# Patient Record
Sex: FEMALE | Race: ASIAN | Employment: UNEMPLOYED | URBAN - METROPOLITAN AREA
[De-identification: names, ages, dates, MRNs, and addresses within clinical notes are randomized per-mention and may not be internally consistent; named-entity substitution may affect disease eponyms.]

---

## 2020-08-26 LAB
HBSAG, EXTERNAL: NORMAL
HIV, EXTERNAL: NORMAL
RUBELLA, EXTERNAL: NORMAL
TYPE, ABO & RH, EXTERNAL: NORMAL

## 2020-11-19 LAB
CHLAMYDIA, EXTERNAL: NORMAL
N. GONORRHEA, EXTERNAL: NORMAL

## 2020-12-31 ENCOUNTER — HOSPITAL ENCOUNTER (INPATIENT)
Age: 30
LOS: 9 days | Discharge: HOME | DRG: 832 | End: 2021-01-09
Attending: OBSTETRICS & GYNECOLOGY | Admitting: OBSTETRICS & GYNECOLOGY
Payer: COMMERCIAL

## 2020-12-31 PROBLEM — O46.90 VAGINAL BLEEDING IN PREGNANCY: Status: ACTIVE | Noted: 2020-12-31

## 2020-12-31 LAB
ABO + RH BLD: NORMAL
ALBUMIN SERPL-MCNC: 2.8 G/DL (ref 3.5–5)
ALBUMIN/GLOB SERPL: 0.8 {RATIO} (ref 1.1–2.2)
ALP SERPL-CCNC: 110 U/L (ref 45–117)
ALT SERPL-CCNC: 25 U/L (ref 12–78)
ANION GAP SERPL CALC-SCNC: 6 MMOL/L (ref 5–15)
AST SERPL-CCNC: 18 U/L (ref 15–37)
BASOPHILS # BLD: 0 K/UL (ref 0–0.1)
BASOPHILS NFR BLD: 0 % (ref 0–1)
BILIRUB SERPL-MCNC: 0.3 MG/DL (ref 0.2–1)
BLOOD GROUP ANTIBODIES SERPL: NORMAL
BUN SERPL-MCNC: 4 MG/DL (ref 6–20)
BUN/CREAT SERPL: 11 (ref 12–20)
CALCIUM SERPL-MCNC: 8.9 MG/DL (ref 8.5–10.1)
CHLORIDE SERPL-SCNC: 108 MMOL/L (ref 97–108)
CO2 SERPL-SCNC: 24 MMOL/L (ref 21–32)
COVID-19 RAPID TEST, COVR: NOT DETECTED
CREAT SERPL-MCNC: 0.38 MG/DL (ref 0.55–1.02)
DIFFERENTIAL METHOD BLD: ABNORMAL
EOSINOPHIL # BLD: 0.1 K/UL (ref 0–0.4)
EOSINOPHIL NFR BLD: 1 % (ref 0–7)
ERYTHROCYTE [DISTWIDTH] IN BLOOD BY AUTOMATED COUNT: 16.7 % (ref 11.5–14.5)
GLOBULIN SER CALC-MCNC: 3.6 G/DL (ref 2–4)
GLUCOSE SERPL-MCNC: 61 MG/DL (ref 65–100)
GRBS, EXTERNAL: POSITIVE
GRBS, EXTERNAL: POSITIVE
HCT VFR BLD AUTO: 33.1 % (ref 35–47)
HGB BLD-MCNC: 10.8 G/DL (ref 11.5–16)
IMM GRANULOCYTES # BLD AUTO: 0 K/UL
IMM GRANULOCYTES NFR BLD AUTO: 0 %
LYMPHOCYTES # BLD: 1.9 K/UL (ref 0.8–3.5)
LYMPHOCYTES NFR BLD: 19 % (ref 12–49)
MCH RBC QN AUTO: 26.6 PG (ref 26–34)
MCHC RBC AUTO-ENTMCNC: 32.6 G/DL (ref 30–36.5)
MCV RBC AUTO: 81.5 FL (ref 80–99)
METAMYELOCYTES NFR BLD MANUAL: 1 %
MONOCYTES # BLD: 0.8 K/UL (ref 0–1)
MONOCYTES NFR BLD: 8 % (ref 5–13)
NEUTS SEG # BLD: 7.2 K/UL (ref 1.8–8)
NEUTS SEG NFR BLD: 71 % (ref 32–75)
NRBC # BLD: 0 K/UL (ref 0–0.01)
NRBC BLD-RTO: 0 PER 100 WBC
PLATELET # BLD AUTO: 144 K/UL (ref 150–400)
PLATELET COMMENTS,PCOM: ABNORMAL
PMV BLD AUTO: 10.8 FL (ref 8.9–12.9)
POTASSIUM SERPL-SCNC: 3.9 MMOL/L (ref 3.5–5.1)
PROT SERPL-MCNC: 6.4 G/DL (ref 6.4–8.2)
RBC # BLD AUTO: 4.06 M/UL (ref 3.8–5.2)
RBC MORPH BLD: ABNORMAL
SODIUM SERPL-SCNC: 138 MMOL/L (ref 136–145)
SOURCE, COVRS: NORMAL
SPECIMEN EXP DATE BLD: NORMAL
SPECIMEN SOURCE, FCOV2M: NORMAL
SPECIMEN TYPE, XMCV1T: NORMAL
WBC # BLD AUTO: 10.2 K/UL (ref 3.6–11)
WBC MORPH BLD: ABNORMAL

## 2020-12-31 PROCEDURE — 80053 COMPREHEN METABOLIC PANEL: CPT

## 2020-12-31 PROCEDURE — 85025 COMPLETE CBC W/AUTO DIFF WBC: CPT

## 2020-12-31 PROCEDURE — 65270000029 HC RM PRIVATE

## 2020-12-31 PROCEDURE — 87147 CULTURE TYPE IMMUNOLOGIC: CPT

## 2020-12-31 PROCEDURE — 87635 SARS-COV-2 COVID-19 AMP PRB: CPT

## 2020-12-31 PROCEDURE — 86901 BLOOD TYPING SEROLOGIC RH(D): CPT

## 2020-12-31 PROCEDURE — 87081 CULTURE SCREEN ONLY: CPT

## 2020-12-31 PROCEDURE — 74011250636 HC RX REV CODE- 250/636: Performed by: OBSTETRICS & GYNECOLOGY

## 2020-12-31 PROCEDURE — 99284 EMERGENCY DEPT VISIT MOD MDM: CPT

## 2020-12-31 PROCEDURE — 36415 COLL VENOUS BLD VENIPUNCTURE: CPT

## 2020-12-31 PROCEDURE — 74011250636 HC RX REV CODE- 250/636

## 2020-12-31 PROCEDURE — 74011000258 HC RX REV CODE- 258: Performed by: OBSTETRICS & GYNECOLOGY

## 2020-12-31 RX ORDER — BETAMETHASONE SODIUM PHOSPHATE AND BETAMETHASONE ACETATE 3; 3 MG/ML; MG/ML
12 INJECTION, SUSPENSION INTRA-ARTICULAR; INTRALESIONAL; INTRAMUSCULAR; SOFT TISSUE EVERY 24 HOURS
Status: COMPLETED | OUTPATIENT
Start: 2020-12-31 | End: 2021-01-01

## 2020-12-31 RX ORDER — TERBUTALINE SULFATE 1 MG/ML
0.25 INJECTION SUBCUTANEOUS AS NEEDED
Status: DISCONTINUED | OUTPATIENT
Start: 2020-12-31 | End: 2021-01-01 | Stop reason: HOSPADM

## 2020-12-31 RX ORDER — MAGNESIUM SULFATE HEPTAHYDRATE 40 MG/ML
4 INJECTION, SOLUTION INTRAVENOUS ONCE
Status: COMPLETED | OUTPATIENT
Start: 2020-12-31 | End: 2020-12-31

## 2020-12-31 RX ORDER — SODIUM CHLORIDE, SODIUM LACTATE, POTASSIUM CHLORIDE, CALCIUM CHLORIDE 600; 310; 30; 20 MG/100ML; MG/100ML; MG/100ML; MG/100ML
125 INJECTION, SOLUTION INTRAVENOUS CONTINUOUS
Status: DISCONTINUED | OUTPATIENT
Start: 2020-12-31 | End: 2021-01-09 | Stop reason: HOSPADM

## 2020-12-31 RX ORDER — MAGNESIUM SULFATE HEPTAHYDRATE 40 MG/ML
INJECTION, SOLUTION INTRAVENOUS
Status: COMPLETED
Start: 2020-12-31 | End: 2020-12-31

## 2020-12-31 RX ORDER — CALCIUM CARBONATE 200(500)MG
200 TABLET,CHEWABLE ORAL
Status: DISCONTINUED | OUTPATIENT
Start: 2021-01-01 | End: 2020-12-31

## 2020-12-31 RX ORDER — CALCIUM CARBONATE 200(500)MG
200 TABLET,CHEWABLE ORAL
Status: DISCONTINUED | OUTPATIENT
Start: 2021-01-01 | End: 2021-01-01

## 2020-12-31 RX ORDER — SODIUM CHLORIDE 0.9 % (FLUSH) 0.9 %
5-40 SYRINGE (ML) INJECTION AS NEEDED
Status: DISCONTINUED | OUTPATIENT
Start: 2020-12-31 | End: 2021-01-01 | Stop reason: HOSPADM

## 2020-12-31 RX ORDER — ACETAMINOPHEN 325 MG/1
650 TABLET ORAL
Status: DISCONTINUED | OUTPATIENT
Start: 2020-12-31 | End: 2021-01-01 | Stop reason: HOSPADM

## 2020-12-31 RX ADMIN — SODIUM CHLORIDE, POTASSIUM CHLORIDE, SODIUM LACTATE AND CALCIUM CHLORIDE 125 ML/HR: 600; 310; 30; 20 INJECTION, SOLUTION INTRAVENOUS at 12:00

## 2020-12-31 RX ADMIN — MAGNESIUM SULFATE HEPTAHYDRATE 2 G/HR: 500 INJECTION, SOLUTION INTRAMUSCULAR; INTRAVENOUS at 22:23

## 2020-12-31 RX ADMIN — SODIUM CHLORIDE 2.5 MILLION UNITS: 9 INJECTION, SOLUTION INTRAVENOUS at 21:51

## 2020-12-31 RX ADMIN — MAGNESIUM SULFATE HEPTAHYDRATE 4 G: 40 INJECTION, SOLUTION INTRAVENOUS at 16:40

## 2020-12-31 RX ADMIN — SODIUM CHLORIDE 5 MILLION UNITS: 900 INJECTION INTRAVENOUS at 17:30

## 2020-12-31 RX ADMIN — MAGNESIUM SULFATE HEPTAHYDRATE 2 G/HR: 500 INJECTION, SOLUTION INTRAMUSCULAR; INTRAVENOUS at 17:10

## 2020-12-31 RX ADMIN — BETAMETHASONE SODIUM PHOSPHATE AND BETAMETHASONE ACETATE 12 MG: 3; 3 INJECTION, SUSPENSION INTRA-ARTICULAR; INTRALESIONAL; INTRAMUSCULAR at 13:07

## 2020-12-31 NOTE — PROGRESS NOTES
2020 11:42 AM 29 YO  at 25.5 weeks admitted for observation from office to Dr. Jackelin Hardwick for Dr. Francisco Oliveira. Pt presents with bleeding. Pad changed on admission and scant bright red blood noted. Not saturated to edges of pad. Pt admission init  1200 PIV and labwork done  1230 Dr. Jackelin Hardwick at bedside discussing plan with pt to monitor and init betamethasone. 1300 First betamethasone given. Pt given crackers and water. 1545 Pt called out to say she felt increased bleeding. Pad saturated and orange sized clot expelled from vagina. Dr. Jackelin Hardwick called to bedside  1600 Dr. Skyler Mims at bedside Spec exam and SVE performed. FT dilated. Bedside US, pos VTX. DIscussing plan of care with patient. No further active bleeding  1615 Pt up to bathroom  1640 Magnesium bolus init, GBS done  1730 PCN init and rapid covid test done  1800 Pt up to bedside commode with out difficulty. Scant amount blood on toilet paper. No active bleeding  1930 Bedside shift change report given to 15[facility] (oncoming nurse) by Nemo Melgar RN (offgoing nurse). Report included the following information SBAR, Procedure Summary, Intake/Output, MAR, Accordion, Recent Results and Med Rec Status.

## 2020-12-31 NOTE — H&P
History and Physical    Patient: Vero Markham MRN: [id number]  SSN: [id number]    YOB: 1990  Age: 31 y.o. Sex: female      Subjective:      Vero Markham is a 31 y.o.  at 25.5 sent from office with vaginal bleeding. Patient has intermittently had brown spotting during the pregnancy. She was examined in the office yesterday with minimal blood noted. Today bright red heavier bleeding occurred that was on bedding etc. In office cervix was closed and NST was a reassuring. Patient complains of low back pain that is constant and intermittent cramping that started today. Reports her bleeding is a slightly decreased from when she first arrived to office     Anatomy scan in office notes posterior placenta     Labs: O+/Antibody neg/ Rubella immune/ Hep B neg/ HIV neg/ TPA neg/ Gonorrhea neg/ Chlamydia neg     No past medical history on file. Past Surgical History:   Procedure Laterality Date    HX OTHER SURGICAL  2019    D&C      No family history on file. Social History     Tobacco Use    Smoking status: Never Smoker    Smokeless tobacco: Never Used   Substance Use Topics    Alcohol use: Not Currently      Prior to Admission medications    Medication Sig Start Date End Date Taking? Authorizing Provider   prenatal vit calc,iron,folic (PRENATAL VITAMIN PO) Take 1 Tab by mouth nightly. Indications: pregnancy   Yes Lorice Kocher, MD        No Known Allergies    Review of Systems:  A comprehensive review of systems was negative except for that written in the History of Present Illness.     Objective:     Vitals:    20 1156 20 1215   BP: 103/63    Pulse: (!) 101    Resp: 14    Temp: 98.4 °F (36.9 °C)    Weight:  60.3 kg (133 lb)   Height:  5' 1\" (1.549 m)        Physical Exam:  Gen: alert and oriented X3   Resp:nonlabored respirations  Cardiac: normal peripheral perfusion  Abdomen: Gravid   SVE: per Dr. Angela Pacheco in office closed/long/ high     Assessment:     Hospital Problems  Never Reviewed          Codes Class Noted POA    Vaginal bleeding in pregnancy ICD-10-CM: O46.90  ICD-9-CM: 641.93  2020 Unknown              Assessment and Plan:     30 yo  at 25.5 with bleeding. Posterior placenta. Blood type O+. Type and screen and CBC now   Continuous fetal monitoring   IV and fluid hydration   NPO now  BMZ ordered   Will continue to monitor bleeding at this time   Should bleeding increase or signs of fetal distress occur will start Magnesium for neruoprotection and proceed with delivery.        Signed By: Richard Donahue MD     2020

## 2020-12-31 NOTE — CONSULTS
Neonatology Antepartum Consultation    Name: Vero Markham   Medical Record Number: [id number]   YOB: 1990  Today's Date: 2020                                                                 Date of Consultation:  2020  Time: 5:02 PM  NICU Provider: Rm Cameron PA-C  Referring Physician: Sarah Grossman MD  Admission Diagnoses: Vaginal bleeding in pregnancy [O46.90]  Reason for Consultation:  25 5/7 weeks gestation pregnancy with vaginal bleeding. Most recent exam with cervix now fingertip and Mom having some contractions. Consult for possible delivery of  infant given concerns for marginal abruption versus  labor. Objective:     Age: 31 y.o.  [place]  Para:   Gestation age: 25w10d    Rupture of Membrane: Maternal steroids:  Yes (fist dose 20 at 1300)     Medications:   Current Facility-Administered Medications   Medication Dose Route Frequency    acetaminophen (TYLENOL) tablet 650 mg  650 mg Oral Q4H PRN    lactated Ringers infusion  125 mL/hr IntraVENous CONTINUOUS    betamethasone (CELESTONE) injection 12 mg  12 mg IntraMUSCular Q24H    magnesium sulfate 10 gm/250 mL D5W infusion  2 g/hr IntraVENous CONTINUOUS    penicillin G potassium (PFIZERPEN) 5 Million Units in 0.9% sodium chloride (MBP/ADV) 100 mL MBP  5 Million Units IntraVENous NOW         Data Review:   Prenatal Labs:     Lab Results   Component Value Date/Time    ABO/Rh(D) O POSITIVE 2020 12:38 PM             Assessment:     Active Problems:    Vaginal bleeding in pregnancy (2020)         Concerns/Plan:     Explained that while chances of survival are good, risk factors due to gestational age include RDS, BPD/CLD, IVH, CP, NEC, Temp instability, Sepsis, malnutrition, jaundice, however, not limited to the above.     [x]    Explained NICU coverage and team approach  [x]    Answered questions  [x]    Offered tour  [x]    Obtained consents  [x] Discussed Benefits of Breast Feeding    Summary: Bryanna Chapman is 31 y.o., , at 25w5d gestation, with hx of intermittent spotting, now with active vaginal bleeding. Exam with cervix closed in office and reassuring NST. On admission, now fingertip and having some contractions. Anatomy scan with posterior placenta, female infant. Fetal monitoring stable per H&P. Maternal labs unremarkable, GBS unknown (being sent now). Maternal hx significant for vaginal bleeding, otherwise uncomplicated pregnancy. Mom being started on IVF hydration and given betamethasone, magnesium sulfate, and PCN. She and father are present for consult. They are very pleasant and ask appropriate questions which were answered / acknowledged. Explained the above to them in addition to discussion about anticipated length of stay and potential long term outcomes. Mother intends to provide breast milk and benefits of such explained. Discussed 24/7 visitation and NICU cams. Thank you for the opportunity to consult them and please don't hesitate to call with questions / concerns (NICU ext [phone]). 60 minutes spent on all aspects of care including face to face discussion, reviewing labs, H&P, maternal hx, relevant literature, and creating note. Of this time,  > 50% was spent face to face with patient and on floor.     Signed: Gertrudis Mackenzie, PRISCLILA, APRN, NNP-BC  Date/Time: 2020 @8030

## 2021-01-01 LAB
BACTERIA SPEC CULT: ABNORMAL
SERVICE CMNT-IMP: ABNORMAL

## 2021-01-01 PROCEDURE — 74011000258 HC RX REV CODE- 258: Performed by: OBSTETRICS & GYNECOLOGY

## 2021-01-01 PROCEDURE — 74011250636 HC RX REV CODE- 250/636: Performed by: OBSTETRICS & GYNECOLOGY

## 2021-01-01 PROCEDURE — 59025 FETAL NON-STRESS TEST: CPT

## 2021-01-01 PROCEDURE — 74011250637 HC RX REV CODE- 250/637: Performed by: OBSTETRICS & GYNECOLOGY

## 2021-01-01 PROCEDURE — 65410000002 HC RM PRIVATE OB

## 2021-01-01 RX ORDER — CALCIUM CARBONATE 200(500)MG
200 TABLET,CHEWABLE ORAL
Status: DISCONTINUED | OUTPATIENT
Start: 2021-01-01 | End: 2021-01-09 | Stop reason: HOSPADM

## 2021-01-01 RX ADMIN — SODIUM CHLORIDE 2.5 MILLION UNITS: 9 INJECTION, SOLUTION INTRAVENOUS at 02:00

## 2021-01-01 RX ADMIN — MAGNESIUM SULFATE HEPTAHYDRATE 2 G/HR: 500 INJECTION, SOLUTION INTRAMUSCULAR; INTRAVENOUS at 08:18

## 2021-01-01 RX ADMIN — SODIUM CHLORIDE 2.5 MILLION UNITS: 9 INJECTION, SOLUTION INTRAVENOUS at 06:00

## 2021-01-01 RX ADMIN — CALCIUM CARBONATE (ANTACID) CHEW TAB 500 MG 200 MG: 500 CHEW TAB at 00:00

## 2021-01-01 RX ADMIN — CALCIUM CARBONATE (ANTACID) CHEW TAB 500 MG 200 MG: 500 CHEW TAB at 17:53

## 2021-01-01 RX ADMIN — BETAMETHASONE SODIUM PHOSPHATE AND BETAMETHASONE ACETATE 12 MG: 3; 3 INJECTION, SUSPENSION INTRA-ARTICULAR; INTRALESIONAL; INTRAMUSCULAR at 13:04

## 2021-01-01 RX ADMIN — MAGNESIUM SULFATE HEPTAHYDRATE 2 G/HR: 500 INJECTION, SOLUTION INTRAMUSCULAR; INTRAVENOUS at 03:19

## 2021-01-01 NOTE — PROGRESS NOTES
0740 Bedside shift change report given to Priscilla Tillman RN (oncoming nurse) by Shivani Dinh RN (offgoing nurse). Report included the following information SBAR, Intake/Output and MAR.     0915 Patient complaining of feeling heart palptations. Continuing to monitor heart rate, patient encouraged to take slow breaths. Maternal heart rate within normal range. 6350 Dr Laurita Fonseca notified of patient complaining of heart palpations and  into see patient. SVE by her. No cervical change noted. 1330 Patient to [facility] via wheelchair. 1335 TRANSFER - OUT REPORT:    Verbal report given to Mark Mena RN(name) on [address] [place] [address]  being transferred to [facility](unit) for routine progression of care       Report consisted of patients Situation, Background, Assessment and   Recommendations(SBAR). Information from the following report(s) SBAR, Intake/Output and MAR was reviewed with the receiving nurse. Lines:   Peripheral IV 12/31/20 Anterior; Left Forearm (Active)        Opportunity for questions and clarification was provided.       Patient transported with:   Registered Nurse

## 2021-01-01 NOTE — PROGRESS NOTES
Ante Partum Progress Note    Ingrid Elmore  25w6d    Assessment: 25w6d with vaginal bleeding concerning for partial abruption    Plan:      1. Vaginal bleeding  -Suspect partial abruption  -Has decreased  -Cervix unchanged (fingertip) so low concern for PTL at this time  -S/p mag and penG  -S/p BMZ x1 (next dose due today at 1300)  -S/p NICU consult  -MFM consult with US on Monday   -Plan to move to antepartum unit with bid NSTs this afternoon if remains stable   -Plan to monitor through the weekend given duration of VB      Orders/Charges: High    Patient states she is feeling better. She denies ctx, abdominal pain, LOF. +FM. Her bleeding has decreased and is dark brown/red in nature. Vitals:  Visit Vitals  BP (!) 104/55   Pulse (!) 102   Temp 98 °F (36.7 °C)   Resp 16   Ht 5' 1\" (1.549 m)   Wt 60.3 kg (133 lb)   SpO2 98%   Breastfeeding No   BMI 25.13 kg/m²     Temp (24hrs), Av.2 °F (36.8 °C), Min:98 °F (36.7 °C), Max:98.4 °F (36.9 °C)      Last 24hr Input/Output:    Intake/Output Summary (Last 24 hours) at 2021 0944  Last data filed at 2021 0900  Gross per 24 hour   Intake 1941.67 ml   Output 3150 ml   Net -1208.33 ml        Non stress test:  Reactive    Patient Vitals for the past 4 hrs: Mode Fetal Heart Rate Fetal Activity Variability Decelerations Accelerations RN Reviewed Strip? Non Stress Test   21 0902 External 135 Present 6-25 BPM Variable Yes Yes    21 0741 External;US Readjusted 130 Present 6-25 BPM None Yes Yes Reactive   21 0700 External 135  6-25 BPM None Yes Yes Reactive   21 0630 External 130  6-25 BPM   Yes    21 0600 External 130  6-25 BPM None  Yes Reactive      Patient Vitals for the past 4 hrs: Mode Fetal Heart Rate Fetal Activity Variability Decelerations Accelerations RN Reviewed Strip?  Non Stress Test   21 0902 External 135 Present 6-25 BPM Variable Yes Yes    21 0741 External;US Readjusted 130 Present 6-25 BPM None Yes Yes Reactive   01/01/21 0700 External 135  6-25 BPM None Yes Yes Reactive   01/01/21 0630 External 130  6-25 BPM   Yes    01/01/21 0600 External 130  6-25 BPM None  Yes Reactive        Uterine Activity: None     Exam:  Patient without distress. Abdomen, fundus soft non-tender     Extremities, no redness or tenderness               Additional Exam: Cervix: FT/long/high    Labs:     Lab Results   Component Value Date/Time    WBC 10.2 12/31/2020 12:38 PM    HGB 10.8 (L) 12/31/2020 12:38 PM    HCT 33.1 (L) 12/31/2020 12:38 PM    PLATELET 732 (L) 21/54/6913 12:38 PM       Recent Results (from the past 24 hour(s))   TYPE & SCREEN    Collection Time: 12/31/20 12:38 PM   Result Value Ref Range    Crossmatch Expiration 01/03/2021,2359     ABO/Rh(D) Vall[place] Bear POSITIVE     Antibody screen NEG    CBC WITH AUTOMATED DIFF    Collection Time: 12/31/20 12:38 PM   Result Value Ref Range    WBC 10.2 3.6 - 11.0 K/uL    RBC 4.06 3.80 - 5.20 M/uL    HGB 10.8 (L) 11.5 - 16.0 g/dL    HCT 33.1 (L) 35.0 - 47.0 %    MCV 81.5 80.0 - 99.0 FL    MCH 26.6 26.0 - 34.0 PG    MCHC 32.6 30.0 - 36.5 g/dL    RDW 16.7 (H) 11.5 - 14.5 %    PLATELET 379 (L) 376 - 400 K/uL    MPV 10.8 8.9 - 12.9 FL    NRBC 0.0 0  WBC    ABSOLUTE NRBC 0.00 0.00 - 0.01 K/uL    NEUTROPHILS 71 32 - 75 %    LYMPHOCYTES 19 12 - 49 %    MONOCYTES 8 5 - 13 %    EOSINOPHILS 1 0 - 7 %    BASOPHILS 0 0 - 1 %    METAMYELOCYTES 1 (H) 0 %    IMMATURE GRANULOCYTES 0 %    ABS. NEUTROPHILS 7.2 1.8 - 8.0 K/UL    ABS. LYMPHOCYTES 1.9 0.8 - 3.5 K/UL    ABS. MONOCYTES 0.8 0.0 - 1.0 K/UL    ABS. EOSINOPHILS 0.1 0.0 - 0.4 K/UL    ABS. BASOPHILS 0.0 0.0 - 0.1 K/UL    ABS. IMM.  GRANS. 0.0 K/UL    DF MANUAL      PLATELET COMMENTS Large Platelets      RBC COMMENTS ANISOCYTOSIS  1+        WBC COMMENTS REACTIVE LYMPHS     METABOLIC PANEL, COMPREHENSIVE    Collection Time: 12/31/20  5:19 PM   Result Value Ref Range    Sodium 138 136 - 145 mmol/L    Potassium 3.9 3.5 - 5.1 mmol/L    Chloride 108 97 - 108 mmol/L    CO2 24 21 - 32 mmol/L    Anion gap 6 5 - 15 mmol/L    Glucose 61 (L) 65 - 100 mg/dL    BUN 4 (L) 6 - 20 MG/DL    Creatinine 0.38 (L) 0.55 - 1.02 MG/DL    BUN/Creatinine ratio 11 (L) 12 - 20      GFR est AA >60 >60 ml/min/1.73m2    GFR est non-AA >60 >60 ml/min/1.73m2    Calcium 8.9 8.5 - 10.1 MG/DL    Bilirubin, total 0.3 0.2 - 1.0 MG/DL    ALT (SGPT) 25 12 - 78 U/L    AST (SGOT) 18 15 - 37 U/L    Alk.  phosphatase 110 45 - 117 U/L    Protein, total 6.4 6.4 - 8.2 g/dL    Albumin 2.8 (L) 3.5 - 5.0 g/dL    Globulin 3.6 2.0 - 4.0 g/dL    A-G Ratio 0.8 (L) 1.1 - 2.2     SARS-COV-2    Collection Time: 12/31/20  5:27 PM   Result Value Ref Range    Specimen source Nasopharyngeal      Specimen source Nasopharyngeal      COVID-19 rapid test Not detected NOTD      Specimen type NP Swab

## 2021-01-02 PROCEDURE — 65410000002 HC RM PRIVATE OB

## 2021-01-02 PROCEDURE — 74011250637 HC RX REV CODE- 250/637: Performed by: OBSTETRICS & GYNECOLOGY

## 2021-01-02 PROCEDURE — 59025 FETAL NON-STRESS TEST: CPT

## 2021-01-02 RX ORDER — LANOLIN ALCOHOL/MO/W.PET/CERES
1 CREAM (GRAM) TOPICAL
Status: DISCONTINUED | OUTPATIENT
Start: 2021-01-02 | End: 2021-01-09 | Stop reason: HOSPADM

## 2021-01-02 RX ORDER — SWAB
1 SWAB, NON-MEDICATED MISCELLANEOUS DAILY
Status: DISCONTINUED | OUTPATIENT
Start: 2021-01-02 | End: 2021-01-09 | Stop reason: HOSPADM

## 2021-01-02 RX ADMIN — Medication 1 TABLET: at 08:31

## 2021-01-02 RX ADMIN — FERROUS SULFATE TAB 325 MG (65 MG ELEMENTAL FE) 325 MG: 325 (65 FE) TAB at 08:31

## 2021-01-02 NOTE — PROGRESS NOTES
6998 Verbal shift change report given to Juan Luis Bundy RN (oncoming nurse) by Markel Meraz RN (offgoing nurse). Report included the following information SBAR and Kardex. Pt resting comfortably during shift change.

## 2021-01-02 NOTE — PROGRESS NOTES
Bedside and Verbal shift change report given to TANESHA Davis (oncoming nurse) by Kyle Mcwilliams. Gallito Gallegos RN (offgoing nurse). Report included the following information SBAR, Kardex, Intake/Output, MAR, Accordion and Recent Results.

## 2021-01-02 NOTE — PROGRESS NOTES
Ante Partum Progress Note    Ingrid Elmore  26w0d    Assessment: 26w0d with vaginal bleeding concerning for partial abruption    Plan:      1. Vaginal bleeding  -Suspect partial abruption  -Has decreased  -Cervix unchanged (fingertip) so low concern for PTL at this time  -S/p mag and penG  -S/p BMZ course (completed )   -S/p NICU consult  -MFM consult with US on Monday   -Plan to monitor through the weekend given duration of VB  -T&S active ()      Orders/Charges: High    Patient states she is feeling better. She denies ctx, abdominal pain, LOF. +FM. Her bleeding has decreased and is noted only when wiping. Vitals:  Visit Vitals  BP (!) 100/51 (BP 1 Location: Left arm, BP Patient Position: At rest)   Pulse 77   Temp 97.7 °F (36.5 °C)   Resp 16   Ht 5' 1\" (1.549 m)   Wt 60.3 kg (133 lb)   SpO2 98%   Breastfeeding No   BMI 25.13 kg/m²     Temp (24hrs), Av °F (36.7 °C), Min:97.7 °F (36.5 °C), Max:98.3 °F (36.8 °C)      Last 24hr Input/Output:    Intake/Output Summary (Last 24 hours) at 2021 0748  Last data filed at 2021 1000  Gross per 24 hour   Intake 446.67 ml   Output 700 ml   Net -253.33 ml        Non stress test:  Reactive    No data found. No data found. Uterine Activity: None     Exam:  Patient without distress. Abdomen, fundus soft non-tender     Extremities, no redness or tenderness               Additional Exam: Cervix: FT/long/high (per my exam on )    Labs:     Lab Results   Component Value Date/Time    WBC 10.2 2020 12:38 PM    HGB 10.8 (L) 2020 12:38 PM    HCT 33.1 (L) 2020 12:38 PM    PLATELET 580 (L)  12:38 PM       No results found for this or any previous visit (from the past 24 hour(s)).

## 2021-01-03 LAB
ABO + RH BLD: NORMAL
BLOOD GROUP ANTIBODIES SERPL: NORMAL
SPECIMEN EXP DATE BLD: NORMAL

## 2021-01-03 PROCEDURE — 65410000002 HC RM PRIVATE OB

## 2021-01-03 PROCEDURE — 74011250637 HC RX REV CODE- 250/637: Performed by: OBSTETRICS & GYNECOLOGY

## 2021-01-03 PROCEDURE — 36415 COLL VENOUS BLD VENIPUNCTURE: CPT

## 2021-01-03 PROCEDURE — 86900 BLOOD TYPING SEROLOGIC ABO: CPT

## 2021-01-03 PROCEDURE — 59025 FETAL NON-STRESS TEST: CPT

## 2021-01-03 RX ORDER — SODIUM CHLORIDE 0.9 % (FLUSH) 0.9 %
5-40 SYRINGE (ML) INJECTION EVERY 8 HOURS
Status: DISCONTINUED | OUTPATIENT
Start: 2021-01-03 | End: 2021-01-09 | Stop reason: HOSPADM

## 2021-01-03 RX ADMIN — Medication 10 ML: at 21:08

## 2021-01-03 RX ADMIN — Medication 1 TABLET: at 09:48

## 2021-01-03 RX ADMIN — FERROUS SULFATE TAB 325 MG (65 MG ELEMENTAL FE) 325 MG: 325 (65 FE) TAB at 09:48

## 2021-01-03 NOTE — PROGRESS NOTES
0428 Verbal shift change report given to Bruce López RN (oncoming nurse) by Mame Farmer RN (offgoing nurse). Report included the following information SBAR. Pt and visitors sleeping peacefully during shift change. 0950 Pt is bringing food from home to eat for meals. Removed pt's Regular Diet order. Discussed with patient that we can add back if she decides to eat hospital food. 1555 Pt called out asking for RN. Pt reports bright red blood on her bed. RN notes bright red blood smear on the bed sheet. RN provided pt with peripad and briefs for closer monitoring. 1650 Reevaluated bleeding. Pt appears to have light spotting with intermittent back pain. Will notify MD.    5375 Spoke with Dr. Ave Cage. Will put pt on Monitor. 0 Pt called out for assistance. MD notified of monitoring strip. Baseline 155, accels present, no decels, no contractions noted and pt denies contractions.

## 2021-01-03 NOTE — PROGRESS NOTES
Bedside and Verbal shift change report given to TANESHA Gilliam (oncoming nurse) by Bethanie White RN (offgoing nurse). Report included the following information SBAR, Kardex, Intake/Output, MAR, Accordion and Recent Results.

## 2021-01-03 NOTE — PROGRESS NOTES
Ante Partum Progress Note    Ingrid Elmore  26w1d    Assessment: 26w1d with vaginal bleeding concerning for partial abruption    Plan:      1. Vaginal bleeding  -Suspect partial abruption  -Has decreased  -Cervix unchanged (fingertip) so low concern for PTL at this time  -S/p mag and penG  -S/p BMZ course (completed )   -S/p NICU consult  -MFM consult with US on Monday   -Plan to monitor through the weekend given duration of VB  -T&S today      Orders/Charges: High    Patient states she is feeling better. She denies ctx, abdominal pain, LOF. +FM. Her bleeding has decreased and is noted only when wiping. Vitals:  Visit Vitals  BP 95/60 (BP 1 Location: Left arm, BP Patient Position: At rest)   Pulse 84   Temp 97.6 °F (36.4 °C)   Resp 16   Ht 5' 1\" (1.549 m)   Wt 60.3 kg (133 lb)   SpO2 98%   Breastfeeding No   BMI 25.13 kg/m²     Temp (24hrs), Av.1 °F (36.7 °C), Min:97.6 °F (36.4 °C), Max:98.3 °F (36.8 °C)      Last 24hr Input/Output:  No intake or output data in the 24 hours ending 21 0756     Non stress test:  Reactive    No data found. No data found. Uterine Activity: None     Exam:  Patient without distress. Abdomen, fundus soft non-tender     Extremities, no redness or tenderness               Additional Exam: Cervix: FT/long/high (per my exam on )    Labs:     Lab Results   Component Value Date/Time    WBC 10.2 2020 12:38 PM    HGB 10.8 (L) 2020 12:38 PM    HCT 33.1 (L) 2020 12:38 PM    PLATELET 471 (L) 8227 12:38 PM       No results found for this or any previous visit (from the past 24 hour(s)).

## 2021-01-04 PROCEDURE — 65410000002 HC RM PRIVATE OB

## 2021-01-04 PROCEDURE — 59025 FETAL NON-STRESS TEST: CPT

## 2021-01-04 PROCEDURE — 76815 OB US LIMITED FETUS(S): CPT | Performed by: OBSTETRICS & GYNECOLOGY

## 2021-01-04 PROCEDURE — 74011250637 HC RX REV CODE- 250/637: Performed by: OBSTETRICS & GYNECOLOGY

## 2021-01-04 RX ADMIN — Medication 1 TABLET: at 13:32

## 2021-01-04 RX ADMIN — FERROUS SULFATE TAB 325 MG (65 MG ELEMENTAL FE) 325 MG: 325 (65 FE) TAB at 13:32

## 2021-01-04 RX ADMIN — Medication 10 ML: at 06:05

## 2021-01-04 NOTE — PROGRESS NOTES
T.O.C:   Pt expected to d/c to home   Family to provide transport at d/c   Emergency Contact: Yolanda Saunders, spouse, [phone]    CM met with pt at bedside; verified demographics, insurance, PCP and emergency contact. Pt expected to d/c to home with family to provide transport. Prior to admission pt ambulates independently; has adequate support. Pt spouse had questions concerning insurance payment of hospital bill. CM advised pt to contact insurance company for information. CM discussed may need to wait on bill and then contact billing office if need to set up a payment plan. No other issues at present. CM will continue to follow.     Kinjal Torres RN    Care Management Interventions  PCP Verified by CM: Yes(pt sees OB for PCP)  Palliative Care Criteria Met (RRAT>21 & CHF Dx)?: No  Transition of Care Consult (CM Consult): Discharge Planning  MyChart Signup: No  Discharge Durable Medical Equipment: No  Physical Therapy Consult: No  Occupational Therapy Consult: No  Speech Therapy Consult: No  Current Support Network: Lives with Spouse  Confirm Follow Up Transport: Family  The Plan for Transition of Care is Related to the Following Treatment Goals : medically stable  The Patient and/or Patient Representative was Provided with a Choice of Provider and Agrees with the Discharge Plan?: (n/a)  Freedom of Choice List was Provided with Basic Dialogue that Supports the Patient's Individualized Plan of Care/Goals, Treatment Preferences and Shares the Quality Data Associated with the Providers?: (n/a)  Discharge Location  Discharge Placement: Jesus Dudley RN

## 2021-01-04 NOTE — PROGRESS NOTES
1532 Bedside and Verbal shift change report given to Juan Luis Bundy RN (oncoming nurse) by Tonia Pope RN (offgoing nurse). Report included the following information SBAR.

## 2021-01-04 NOTE — PROGRESS NOTES
In to evaluate patient due to slight increase in VB. Patient had BRB streak on her pad (approximately 1cm wide x 5 cm length). That bleeding occurred over the course of about an hour. New pad on x 50 minutes without any ongoing bleeding. She reports some mild back discomfort but denies any contractions and does have active FM. Patient was placed on the monitor and no contractions were noted. Baby with CAT I tracing, baseline 150, moderate variability, accels present, decels absent. Spoke with patient,  and brother. Reassured them that the present circumstances do not warrant change in management at this time. We will continue to monitor bleeding closely and if bleeding is worrisome for mom or baby's safety we would at that point move forward with delivery. Patient was concerned about planned discharge tomorrow. I advised that given new onset of small amount BRB this evening, I would expect her discharge to be delayed as we typically like to have 24-48 hours of no bleeding prior to discharge. Patient to notify us of increased bleeding or onset of contractions/pain.

## 2021-01-04 NOTE — PROGRESS NOTES
Verbal shift change report given to YURY Florentino RN  (oncoming nurse) by VIJAY Strong RN  (offgoing nurse). Report included the following information SBAR, Kardex, Intake/Output, MAR and Recent Results.   Pt resting comfortably.

## 2021-01-05 LAB
BASOPHILS # BLD: 0.1 K/UL (ref 0–0.1)
BASOPHILS NFR BLD: 1 % (ref 0–1)
DIFFERENTIAL METHOD BLD: ABNORMAL
EOSINOPHIL # BLD: 0.2 K/UL (ref 0–0.4)
EOSINOPHIL NFR BLD: 2 % (ref 0–7)
ERYTHROCYTE [DISTWIDTH] IN BLOOD BY AUTOMATED COUNT: 16.3 % (ref 11.5–14.5)
HCT VFR BLD AUTO: 30.3 % (ref 35–47)
HGB BLD-MCNC: 9.8 G/DL (ref 11.5–16)
IMM GRANULOCYTES # BLD AUTO: 0 K/UL
IMM GRANULOCYTES NFR BLD AUTO: 0 %
INR PPP: 1.1 (ref 0.9–1.1)
LYMPHOCYTES # BLD: 2.5 K/UL (ref 0.8–3.5)
LYMPHOCYTES NFR BLD: 23 % (ref 12–49)
MCH RBC QN AUTO: 26.7 PG (ref 26–34)
MCHC RBC AUTO-ENTMCNC: 32.3 G/DL (ref 30–36.5)
MCV RBC AUTO: 82.6 FL (ref 80–99)
MONOCYTES # BLD: 0.9 K/UL (ref 0–1)
MONOCYTES NFR BLD: 8 % (ref 5–13)
NEUTS BAND NFR BLD MANUAL: 5 % (ref 0–6)
NEUTS SEG # BLD: 7.2 K/UL (ref 1.8–8)
NEUTS SEG NFR BLD: 61 % (ref 32–75)
NRBC # BLD: 0 K/UL (ref 0–0.01)
NRBC BLD-RTO: 0 PER 100 WBC
PLATELET # BLD AUTO: 150 K/UL (ref 150–400)
PLATELET COMMENTS,PCOM: ABNORMAL
PMV BLD AUTO: 10.1 FL (ref 8.9–12.9)
PROTHROMBIN TIME: 11.1 SEC (ref 9–11.1)
RBC # BLD AUTO: 3.67 M/UL (ref 3.8–5.2)
RBC MORPH BLD: ABNORMAL
RBC MORPH BLD: ABNORMAL
WBC # BLD AUTO: 10.9 K/UL (ref 3.6–11)

## 2021-01-05 PROCEDURE — 59025 FETAL NON-STRESS TEST: CPT

## 2021-01-05 PROCEDURE — 65410000002 HC RM PRIVATE OB

## 2021-01-05 PROCEDURE — 85025 COMPLETE CBC W/AUTO DIFF WBC: CPT

## 2021-01-05 PROCEDURE — 85610 PROTHROMBIN TIME: CPT

## 2021-01-05 PROCEDURE — 74011250636 HC RX REV CODE- 250/636: Performed by: OBSTETRICS & GYNECOLOGY

## 2021-01-05 PROCEDURE — 74011250637 HC RX REV CODE- 250/637: Performed by: OBSTETRICS & GYNECOLOGY

## 2021-01-05 PROCEDURE — 36415 COLL VENOUS BLD VENIPUNCTURE: CPT

## 2021-01-05 RX ORDER — SODIUM CHLORIDE, SODIUM LACTATE, POTASSIUM CHLORIDE, CALCIUM CHLORIDE 600; 310; 30; 20 MG/100ML; MG/100ML; MG/100ML; MG/100ML
500 INJECTION, SOLUTION INTRAVENOUS CONTINUOUS
Status: DISCONTINUED | OUTPATIENT
Start: 2021-01-05 | End: 2021-01-09 | Stop reason: HOSPADM

## 2021-01-05 RX ORDER — SODIUM CHLORIDE, SODIUM LACTATE, POTASSIUM CHLORIDE, CALCIUM CHLORIDE 600; 310; 30; 20 MG/100ML; MG/100ML; MG/100ML; MG/100ML
125 INJECTION, SOLUTION INTRAVENOUS CONTINUOUS
Status: DISCONTINUED | OUTPATIENT
Start: 2021-01-05 | End: 2021-01-09 | Stop reason: HOSPADM

## 2021-01-05 RX ADMIN — FERROUS SULFATE TAB 325 MG (65 MG ELEMENTAL FE) 325 MG: 325 (65 FE) TAB at 09:04

## 2021-01-05 RX ADMIN — SODIUM CHLORIDE, POTASSIUM CHLORIDE, SODIUM LACTATE AND CALCIUM CHLORIDE 125 ML/HR: 600; 310; 30; 20 INJECTION, SOLUTION INTRAVENOUS at 09:04

## 2021-01-05 RX ADMIN — Medication 1 TABLET: at 09:04

## 2021-01-05 RX ADMIN — Medication 10 ML: at 00:15

## 2021-01-05 RX ADMIN — SODIUM CHLORIDE, POTASSIUM CHLORIDE, SODIUM LACTATE AND CALCIUM CHLORIDE 125 ML/HR: 600; 310; 30; 20 INJECTION, SOLUTION INTRAVENOUS at 01:00

## 2021-01-05 RX ADMIN — SODIUM CHLORIDE, POTASSIUM CHLORIDE, SODIUM LACTATE AND CALCIUM CHLORIDE 500 ML: 600; 310; 30; 20 INJECTION, SOLUTION INTRAVENOUS at 00:15

## 2021-01-05 NOTE — PROGRESS NOTES
Ante Partum Progress Note    Ingrid Elmore  26w3d    Assessment: 26w3d with vaginal bleeding concerning for partial abruption - ultrasound on  showed a 1x2cm subchorionic hematoma.     Plan:       1. Vaginal bleeding  -Suspect partial abruption  -s/p MFM ultrasound  - normal fetal growth today (48%), 1x2cm subchorionic hemorrhage  -s/p Mag, BMZ x 2, NICU consult  -T&S done    - Another episode of bleeding overnight, hgb repeated and now 9.8 was 10.8 on admission   - Continue to monitor in patient until bleeding resolved         2. GBS positive 20 -  Will need PCN in labor      Orders/Charges: Medium, NST X 2     Patient states she experiencing minimal bleeding since episode overnight. Describes currently as spotting. No contractions. Active fetal movement. Vitals:  Visit Vitals  BP 90/60 (BP 1 Location: Right arm, BP Patient Position: At rest)   Pulse 99   Temp 98.3 °F (36.8 °C)   Resp 16   Ht 5' 1\" (1.549 m)   Wt 60.3 kg (133 lb)   SpO2 98%   Breastfeeding No   BMI 25.13 kg/m²     Temp (24hrs), Av °F (36.7 °C), Min:97.5 °F (36.4 °C), Max:98.4 °F (36.9 °C)      Last 24hr Input/Output:  No intake or output data in the 24 hours ending 21 1147     Non stress test:  Non-reactive but appropriate for gestational age  145/mod/no accels/ no decel     Patient Vitals for the past 4 hrs:   Fetal Activity   21 09 Present      Patient Vitals for the past 4 hrs:   Fetal Activity   21 09 Present        Uterine Activity: None     Exam:  Patient without distress.      Abdomen, fundus soft non-tender     Extremities, no redness or tenderness               Additional Exam: Deferred    Labs:     Lab Results   Component Value Date/Time    WBC 10.9 2021 12:19 AM    WBC 10.2 2020 12:38 PM    HGB 9.8 (L) 2021 12:19 AM    HGB 10.8 (L) 2020 12:38 PM    HCT 30.3 (L) 2021 12:19 AM    HCT 33.1 (L) 2020 12:38 PM    PLATELET 714  12:19 AM    PLATELET 899 (L) 12/31/2020 12:38 PM       Recent Results (from the past 24 hour(s))   CBC WITH AUTOMATED DIFF    Collection Time: 01/05/21 12:19 AM   Result Value Ref Range    WBC 10.9 3.6 - 11.0 K/uL    RBC 3.67 (L) 3.80 - 5.20 M/uL    HGB 9.8 (L) 11.5 - 16.0 g/dL    HCT 30.3 (L) 35.0 - 47.0 %    MCV 82.6 80.0 - 99.0 FL    MCH 26.7 26.0 - 34.0 PG    MCHC 32.3 30.0 - 36.5 g/dL    RDW 16.3 (H) 11.5 - 14.5 %    PLATELET 816 144 - 383 K/uL    MPV 10.1 8.9 - 12.9 FL    NRBC 0.0 0  WBC    ABSOLUTE NRBC 0.00 0.00 - 0.01 K/uL    NEUTROPHILS 61 32 - 75 %    BAND NEUTROPHILS 5 0 - 6 %    LYMPHOCYTES 23 12 - 49 %    MONOCYTES 8 5 - 13 %    EOSINOPHILS 2 0 - 7 %    BASOPHILS 1 0 - 1 %    IMMATURE GRANULOCYTES 0 %    ABS. NEUTROPHILS 7.2 1.8 - 8.0 K/UL    ABS. LYMPHOCYTES 2.5 0.8 - 3.5 K/UL    ABS. MONOCYTES 0.9 0.0 - 1.0 K/UL    ABS. EOSINOPHILS 0.2 0.0 - 0.4 K/UL    ABS. BASOPHILS 0.1 0.0 - 0.1 K/UL    ABS. IMM.  GRANS. 0.0 K/UL    DF MANUAL      PLATELET COMMENTS Large Platelets      RBC COMMENTS ANISOCYTOSIS  1+        RBC COMMENTS POLYCHROMASIA  PRESENT       PROTHROMBIN TIME + INR    Collection Time: 01/05/21 12:24 AM   Result Value Ref Range    INR 1.1 0.9 - 1.1      Prothrombin time 11.1 9.0 - 11.1 sec

## 2021-01-05 NOTE — PROGRESS NOTES
OB Hospitalist    Called to the bedside to evaluate bleeding. Patient she had a gush of bleeding while in bed and on using the commode golf ball sized blood clot noted  Has had back pain  all though the pregnancy. Feels better after emptying her bladder  Has active fetal movement. VSS AF  Alert and oriented  RRR  CTA B/L BS  Gravid, relaxed, no palpable contractions  U/S today 1/4 normal fetal growth today (48%), 1x2cm subchorionic hemorrhage. Posterior placenta, normal cervical length   ,+accels, moderate variability, no decels  [place] No contractions noted  Fluid bolus started  CBC with coags  S/P Steroids, magnesium  CEFM  Pad count  Continue to closely monitor for bleeding  Indications for delivery discussed hemorrhage, NRFHR. Reassured patient and family spouse and brother at the bedside. All questions answered.       Harjeet Eubanks MD

## 2021-01-05 NOTE — PROGRESS NOTES
Ante Partum Progress Note    Ingrid Elmore  26w2d    Assessment: 26w2d with vaginal bleeding concerning for partial abruption - ultrasound today showed a 1x2cm subchorionic hematoma.     Plan:       1. Vaginal bleeding  -Suspect partial abruption  -s/p MFM ultrasound today - normal fetal growth today (48%), 1x2cm subchorionic hemorrhage, may consider discharge once her bleeding stops  -s/p Mag, BMZ x 2, NICU consult  -T&S done today    Continue inpatient observation since bleeding is ongoing    2. GBS positive 20 -  Will need PCN in labor     Orders/Charges: High and NST    Subjective:  Patient reports occasional mild cramping. She continues to have red spotting today, but less than last night. Denies LOF. Baby is active. Vitals:  Visit Vitals  BP (!) 94/57 (BP 1 Location: Right arm, BP Patient Position: At rest)   Pulse 85   Temp 97.8 °F (36.6 °C)   Resp 16   Ht 5' 1\" (1.549 m)   Wt 60.3 kg (133 lb)   SpO2 97%   Breastfeeding No   BMI 25.13 kg/m²     Temp (24hrs), Av.1 °F (36.7 °C), Min:97.8 °F (36.6 °C), Max:98.4 °F (36.9 °C)      Last 24hr Input/Output:  No intake or output data in the 24 hours ending 21     Non stress test:  150, moderate variability, positive accelerations, no decelerations  Tocometry: no contractions    Patient Vitals for the past 4 hrs: Mode Fetal Heart Rate Fetal Activity Variability Decelerations Accelerations RN Reviewed Strip? Non Stress Test   21 1841 External 150 Present 6-25 BPM Variable Yes Yes Reactive      Patient Vitals for the past 4 hrs: Mode Fetal Heart Rate Fetal Activity Variability Decelerations Accelerations RN Reviewed Strip? Non Stress Test   21 1841 External 150 Present 6-25 BPM Variable Yes Yes Reactive          Exam:   Patient without distress.      Abdomen, fundus soft non-tender     Extremities, no redness or tenderness , no calf pain             Labs:     Lab Results   Component Value Date/Time    WBC 10.2 2020 12:38 PM    HGB 10.8 (L) 12/31/2020 12:38 PM    HCT 33.1 (L) 12/31/2020 12:38 PM    PLATELET 937 (L) 86/89/1501 12:38 PM       No results found for this or any previous visit (from the past 24 hour(s)).

## 2021-01-05 NOTE — PROGRESS NOTES
Bedside and Verbal shift change report given to KEYANA Morejon RN (oncoming nurse) by Kenneth Conway RN (offgoing nurse). Report included the following information SBAR, Kardex, Intake/Output, MAR, Accordion and Recent Results. 0000- Pt complaining of lower abdominal cramping and bleeding. Pt in bathroom, large amount of bright red blood on peripad and golfball sized clot in toilet. Pt returned to bed and states cramping has subsided. Dr. Alyson Stockton at bedside. Orders received for 500 mL bolus, cbc, coag studies and to keep baby on monitor for 1 hour. 0105- Pt assisted to the bathroom, small amount of bright red blood on pad, RN assisted pt back to bed still denies cramping and fetal status is reactive of EFM. RN called OB hospitalist, orders to do pad counts and take patient off monitor.

## 2021-01-05 NOTE — PROGRESS NOTES
2340 Pt called out reporting vaginal bleeding - RN noted small amount of blood on pad, increased amount from yesterday, 1/4/21.  Placed pt on monitor and notified MD.

## 2021-01-05 NOTE — PROGRESS NOTES
Bedside and Verbal shift change report given to Pedro Mayorga RN  (oncoming nurse) by KEYANA Grayson RN  (offgoing nurse). Report included the following information SBAR, Kardex, ED Summary, Intake/Output, MAR and Recent Results. Pt. Denies any complaints at this time.

## 2021-01-06 LAB
ABO + RH BLD: NORMAL
APPEARANCE UR: CLEAR
BACTERIA URNS QL MICRO: ABNORMAL /HPF
BASOPHILS # BLD: 0 K/UL (ref 0–0.1)
BASOPHILS NFR BLD: 0 % (ref 0–1)
BILIRUB UR QL: NEGATIVE
BLOOD GROUP ANTIBODIES SERPL: NORMAL
COLOR UR: ABNORMAL
DIFFERENTIAL METHOD BLD: ABNORMAL
EOSINOPHIL # BLD: 0.1 K/UL (ref 0–0.4)
EOSINOPHIL NFR BLD: 1 % (ref 0–7)
EPITH CASTS URNS QL MICRO: ABNORMAL /LPF
ERYTHROCYTE [DISTWIDTH] IN BLOOD BY AUTOMATED COUNT: 16.5 % (ref 11.5–14.5)
GLUCOSE 1H P 100 G GLC PO SERPL-MCNC: 100 MG/DL (ref 65–140)
GLUCOSE UR STRIP.AUTO-MCNC: NEGATIVE MG/DL
HCT VFR BLD AUTO: 29.8 % (ref 35–47)
HGB BLD-MCNC: 9.7 G/DL (ref 11.5–16)
HGB UR QL STRIP: ABNORMAL
HIV 1+2 AB+HIV1 P24 AG SERPL QL IA: NONREACTIVE
HIV12 RESULT COMMENT, HHIVC: NORMAL
IMM GRANULOCYTES # BLD AUTO: 0 K/UL
IMM GRANULOCYTES NFR BLD AUTO: 0 %
KETONES UR QL STRIP.AUTO: NEGATIVE MG/DL
LEUKOCYTE ESTERASE UR QL STRIP.AUTO: ABNORMAL
LYMPHOCYTES # BLD: 1.7 K/UL (ref 0.8–3.5)
LYMPHOCYTES NFR BLD: 15 % (ref 12–49)
MCH RBC QN AUTO: 27.5 PG (ref 26–34)
MCHC RBC AUTO-ENTMCNC: 32.6 G/DL (ref 30–36.5)
MCV RBC AUTO: 84.4 FL (ref 80–99)
METAMYELOCYTES NFR BLD MANUAL: 1 %
MONOCYTES # BLD: 0.2 K/UL (ref 0–1)
MONOCYTES NFR BLD: 2 % (ref 5–13)
MYELOCYTES NFR BLD MANUAL: 3 %
NEUTS BAND NFR BLD MANUAL: 2 % (ref 0–6)
NEUTS SEG # BLD: 8.6 K/UL (ref 1.8–8)
NEUTS SEG NFR BLD: 76 % (ref 32–75)
NITRITE UR QL STRIP.AUTO: NEGATIVE
NRBC # BLD: 0 K/UL (ref 0–0.01)
NRBC BLD-RTO: 0 PER 100 WBC
PH UR STRIP: 8.5 [PH] (ref 5–8)
PLATELET # BLD AUTO: 134 K/UL (ref 150–400)
PMV BLD AUTO: 10.5 FL (ref 8.9–12.9)
PROT UR STRIP-MCNC: NEGATIVE MG/DL
RBC # BLD AUTO: 3.53 M/UL (ref 3.8–5.2)
RBC #/AREA URNS HPF: ABNORMAL /HPF (ref 0–5)
RBC MORPH BLD: ABNORMAL
RBC MORPH BLD: ABNORMAL
SP GR UR REFRACTOMETRY: 1.01 (ref 1–1.03)
SPECIMEN EXP DATE BLD: NORMAL
UA: UC IF INDICATED,UAUC: ABNORMAL
UROBILINOGEN UR QL STRIP.AUTO: 0.2 EU/DL (ref 0.2–1)
WBC # BLD AUTO: 11 K/UL (ref 3.6–11)
WBC URNS QL MICRO: ABNORMAL /HPF (ref 0–4)

## 2021-01-06 PROCEDURE — 87086 URINE CULTURE/COLONY COUNT: CPT

## 2021-01-06 PROCEDURE — 59025 FETAL NON-STRESS TEST: CPT

## 2021-01-06 PROCEDURE — 81001 URINALYSIS AUTO W/SCOPE: CPT

## 2021-01-06 PROCEDURE — 85025 COMPLETE CBC W/AUTO DIFF WBC: CPT

## 2021-01-06 PROCEDURE — 86901 BLOOD TYPING SEROLOGIC RH(D): CPT

## 2021-01-06 PROCEDURE — 86780 TREPONEMA PALLIDUM: CPT

## 2021-01-06 PROCEDURE — 74011250637 HC RX REV CODE- 250/637: Performed by: OBSTETRICS & GYNECOLOGY

## 2021-01-06 PROCEDURE — 87147 CULTURE TYPE IMMUNOLOGIC: CPT

## 2021-01-06 PROCEDURE — 36415 COLL VENOUS BLD VENIPUNCTURE: CPT

## 2021-01-06 PROCEDURE — 74011250636 HC RX REV CODE- 250/636: Performed by: OBSTETRICS & GYNECOLOGY

## 2021-01-06 PROCEDURE — 87389 HIV-1 AG W/HIV-1&-2 AB AG IA: CPT

## 2021-01-06 PROCEDURE — 65410000002 HC RM PRIVATE OB

## 2021-01-06 PROCEDURE — 82950 GLUCOSE TEST: CPT

## 2021-01-06 RX ORDER — CEPHALEXIN 500 MG/1
500 CAPSULE ORAL EVERY 6 HOURS
Status: DISCONTINUED | OUTPATIENT
Start: 2021-01-06 | End: 2021-01-09 | Stop reason: HOSPADM

## 2021-01-06 RX ORDER — ACETAMINOPHEN 325 MG/1
650 TABLET ORAL
Status: DISCONTINUED | OUTPATIENT
Start: 2021-01-06 | End: 2021-01-09 | Stop reason: HOSPADM

## 2021-01-06 RX ADMIN — ACETAMINOPHEN 650 MG: 325 TABLET ORAL at 12:50

## 2021-01-06 RX ADMIN — Medication 1 TABLET: at 09:15

## 2021-01-06 RX ADMIN — SODIUM CHLORIDE, POTASSIUM CHLORIDE, SODIUM LACTATE AND CALCIUM CHLORIDE 1000 ML: 600; 310; 30; 20 INJECTION, SOLUTION INTRAVENOUS at 12:51

## 2021-01-06 RX ADMIN — FERROUS SULFATE TAB 325 MG (65 MG ELEMENTAL FE) 325 MG: 325 (65 FE) TAB at 09:15

## 2021-01-06 RX ADMIN — CEPHALEXIN 500 MG: 500 CAPSULE ORAL at 17:47

## 2021-01-06 NOTE — PROGRESS NOTES
Ante Partum Progress Note    Ingrid Elmore  26w4d    Assessment: 26w4d with vaginal bleeding concerning for partial abruption - ultrasound on  showed a 1x2cm subchorionic hematoma.     Plan:       1. Vaginal bleeding  -Suspect partial abruption  -s/p MFM ultrasound  - normal fetal growth today (48%), 1x2cm subchorionic hemorrhage; cephalic  -s/p Mag, BMZ x 2, NICU consult  -T&S done    - Another episode of bleeding overnight though less than prior bleed; pt states no bleeding currently (-), hgb repeated today and is stable from last Hgb (9.8 on ); was10.8 on admission   - mild spaced contractions noted on am NST; repeat NST shows only rare contraction; no bleeding or LOF currently - reassuring NST  - Continue to monitor in patient until bleeding resolved and pt stable for several days        2. GBS positive 20 -  Will need PCN in labor    3. UA with reflex cx sent today due to occasional contractions on initial NST (which resolved) and c/w UTI  - will start on keflex, cx pending    4. Routine prenatal care  - Pt states was scheduled for glucola in the office yesterday and would therefore like to go ahead and do now; glucola and 28 wk labs ordered; will need tdap as well    Orders/Charges: High and Non Stress Test  X 3    Patient states she has no new complaints    Vitals:  Visit Vitals  BP 99/61 (BP 1 Location: Right arm, BP Patient Position: At rest)   Pulse 99   Temp 97.6 °F (36.4 °C)   Resp 16   Ht 5' 1\" (1.549 m)   Wt 60.3 kg (133 lb)   SpO2 97%   Breastfeeding No   BMI 25.13 kg/m²     Temp (24hrs), Av.1 °F (36.7 °C), Min:97.6 °F (36.4 °C), Max:98.7 °F (37.1 °C)      Last 24hr Input/Output:  No intake or output data in the 24 hours ending 21 1504     Non stress test:  Non-reactive but appropriate for gestational age  Baseline 150, moderate variability, + accels, no decels  Contractions every 10 minutes  REPEAT [place] shows only rare contraction    No data found. No data found. Uterine Activity: None     Exam:  Patient without distress. Abdomen, fundus soft non-tender     Extremities, no redness or tenderness               Additional Exam: Patient without distress, Abdomen soft, non-tender, Fundus soft and non tender, Right upper quadrant non-tender, Perineum No sign of blood or amniotic fluid and Lower extremities edema No    Labs:     Lab Results   Component Value Date/Time    WBC 11.0 01/06/2021 01:36 PM    WBC 10.9 01/05/2021 12:19 AM    WBC 10.2 12/31/2020 12:38 PM    HGB 9.7 (L) 01/06/2021 01:36 PM    HGB 9.8 (L) 01/05/2021 12:19 AM    HGB 10.8 (L) 12/31/2020 12:38 PM    HCT 29.8 (L) 01/06/2021 01:36 PM    HCT 30.3 (L) 01/05/2021 12:19 AM    HCT 33.1 (L) 12/31/2020 12:38 PM    PLATELET 044 (L) 25/62/2502 01:36 PM    PLATELET 618 26/20/4545 12:19 AM    PLATELET 654 (L) 66/12/0229 12:38 PM       Recent Results (from the past 24 hour(s))   CBC WITH AUTOMATED DIFF    Collection Time: 01/06/21  1:36 PM   Result Value Ref Range    WBC 11.0 3.6 - 11.0 K/uL    RBC 3.53 (L) 3.80 - 5.20 M/uL    HGB 9.7 (L) 11.5 - 16.0 g/dL    HCT 29.8 (L) 35.0 - 47.0 %    MCV 84.4 80.0 - 99.0 FL    MCH 27.5 26.0 - 34.0 PG    MCHC 32.6 30.0 - 36.5 g/dL    RDW 16.5 (H) 11.5 - 14.5 %    PLATELET 935 (L) 226 - 400 K/uL    MPV 10.5 8.9 - 12.9 FL    NRBC 0.0 0  WBC    ABSOLUTE NRBC 0.00 0.00 - 0.01 K/uL    NEUTROPHILS 76 (H) 32 - 75 %    BAND NEUTROPHILS 2 0 - 6 %    LYMPHOCYTES 15 12 - 49 %    MONOCYTES 2 (L) 5 - 13 %    EOSINOPHILS 1 0 - 7 %    BASOPHILS 0 0 - 1 %    METAMYELOCYTES 1 (H) 0 %    MYELOCYTES 3 (H) 0 %    IMMATURE GRANULOCYTES 0 %    ABS. NEUTROPHILS 8.6 (H) 1.8 - 8.0 K/UL    ABS. LYMPHOCYTES 1.7 0.8 - 3.5 K/UL    ABS. MONOCYTES 0.2 0.0 - 1.0 K/UL    ABS. EOSINOPHILS 0.1 0.0 - 0.4 K/UL    ABS. BASOPHILS 0.0 0.0 - 0.1 K/UL    ABS. IMM.  GRANS. 0.0 K/UL    DF MANUAL      RBC COMMENTS ANISOCYTOSIS  1+        RBC COMMENTS KATHI CELLS  PRESENT       GLUCOSE, GESTATIONAL 1 HR TOLERANCE Collection Time: 01/06/21  1:36 PM   Result Value Ref Range    Glucose, gestational 1 hr. 100 65 - 140 MG/DL   URINALYSIS W/ REFLEX CULTURE    Collection Time: 01/06/21  1:43 PM    Specimen: Urine   Result Value Ref Range    Color YELLOW/STRAW      Appearance CLEAR CLEAR      Specific gravity 1.006 1.003 - 1.030      pH (UA) 8.5 (H) 5.0 - 8.0      Protein Negative NEG mg/dL    Glucose Negative NEG mg/dL    Ketone Negative NEG mg/dL    Bilirubin Negative NEG      Blood LARGE (A) NEG      Urobilinogen 0.2 0.2 - 1.0 EU/dL    Nitrites Negative NEG      Leukocyte Esterase MODERATE (A) NEG      WBC 10-20 0 - 4 /hpf    RBC 0-5 0 - 5 /hpf    Epithelial cells FEW FEW /lpf    Bacteria 1+ (A) NEG /hpf    UA:UC IF INDICATED URINE CULTURE ORDERED (A) CNI

## 2021-01-06 NOTE — PROGRESS NOTES
Bedside and Verbal shift change report given to [address] (oncoming nurse) by Lauri Hilton RN (offgoing nurse). Report included the following information SBAR, Kardex, Intake/Output, MAR and Recent Results. 3342: Pt states some back and stomach pain that has been there for a couple days. NST reactive. Some contractions noted. Pt states vaginal bleeding is less than last night but more than yesterday. RN visualized small/moderate of bleeding on pad from overnight. Will continue to monitor bleeding. Will notify MD once she is out of the OR.   1040: scant bleeding on pad changed at 0900. [place] applied to monitor for uterine activity  1118: Mild contractions noted every 6-7 minutes. L&D maríad. L&D RN informed and will update Dr Aiyana Vallecillo when she comes out of the OR.   1237: MD at bedside. 1251: Bolus given  1336: Labs sent.  Pt states she is feeling better with less cramping    1554: glucola result done erroneously, needs repeat test in AM

## 2021-01-06 NOTE — PROGRESS NOTES
Bedside and Verbal shift change report given to KEYANA Roldan RN (oncoming nurse) by Mis Hawkins RN (offgoing nurse). Report included the following information SBAR, Kardex, Intake/Output, MAR, Accordion and Recent Results. 2100- Pt's  rang the call bell while patient was in the bathroom stating the patient has started bleeding again. 2102- RN at bedside, small amount of bright red blood on pad and golf ball sized clot in toilet. 2106- Pt back in bed    2108- Placed pt on monitor.

## 2021-01-07 LAB
BACTERIA SPEC CULT: ABNORMAL
CC UR VC: ABNORMAL
GLUCOSE 1H P 100 G GLC PO SERPL-MCNC: 153 MG/DL (ref 65–140)
SERVICE CMNT-IMP: ABNORMAL
T PALLIDUM AB SER QL IF: NON REACTIVE

## 2021-01-07 PROCEDURE — 36415 COLL VENOUS BLD VENIPUNCTURE: CPT

## 2021-01-07 PROCEDURE — 74011250637 HC RX REV CODE- 250/637: Performed by: OBSTETRICS & GYNECOLOGY

## 2021-01-07 PROCEDURE — 74011250636 HC RX REV CODE- 250/636: Performed by: OBSTETRICS & GYNECOLOGY

## 2021-01-07 PROCEDURE — 65410000002 HC RM PRIVATE OB

## 2021-01-07 PROCEDURE — 82950 GLUCOSE TEST: CPT

## 2021-01-07 PROCEDURE — 59025 FETAL NON-STRESS TEST: CPT

## 2021-01-07 RX ADMIN — CEPHALEXIN 500 MG: 500 CAPSULE ORAL at 00:17

## 2021-01-07 RX ADMIN — FERROUS SULFATE TAB 325 MG (65 MG ELEMENTAL FE) 325 MG: 325 (65 FE) TAB at 08:09

## 2021-01-07 RX ADMIN — SODIUM CHLORIDE, POTASSIUM CHLORIDE, SODIUM LACTATE AND CALCIUM CHLORIDE 125 ML/HR: 600; 310; 30; 20 INJECTION, SOLUTION INTRAVENOUS at 22:27

## 2021-01-07 RX ADMIN — CEPHALEXIN 500 MG: 500 CAPSULE ORAL at 17:32

## 2021-01-07 RX ADMIN — CEPHALEXIN 500 MG: 500 CAPSULE ORAL at 06:02

## 2021-01-07 RX ADMIN — Medication 1 TABLET: at 08:09

## 2021-01-07 RX ADMIN — CEPHALEXIN 500 MG: 500 CAPSULE ORAL at 11:38

## 2021-01-07 NOTE — PROGRESS NOTES
Ante Partum Progress Note    Ingrid Elmore  26w5d    Assessment: 26w5d with vaginal bleeding concerning for partial abruption - ultrasound on  showed a 1x2cm subchorionic hematoma.     Plan:       1. Vaginal bleeding  -Suspect partial abruption  -s/p MFM ultrasound  - normal fetal growth today (48%), 1x2cm subchorionic hemorrhage; cephalic  -s/p Mag, BMZ x 2, NICU consult  -T&S done    - Another episode of bleeding overnight though less than prior bleed; pt states no bleeding currently (-), hgb repeated  and is stable from last Hgb (9.8 on ); was10.8 on admission   - mild spaced contractions noted on am NST; repeat NST shows only rare contraction; no bleeding or LOF currently - reassuring NST  - Continue to monitor in patient until bleeding resolved and pt stable for several days        2. GBS positive 20 -  Will need PCN in labor    3. UA with reflex cx sent today due to occasional contractions on initial NST (which resolved) and c/w UTI  - will start on keflex, cx pending    4. Routine prenatal care  -1 hour , will do 3 hour GTT in am      Orders/Charges: High and Non Stress Test  X 3    Patient states she has no new complaints. Cramping has resolved. Scant spotting noted. No  LOF. +FM. Vitals:  Visit Vitals  /64 (BP 1 Location: Right arm, BP Patient Position: At rest)   Pulse 94   Temp 97.4 °F (36.3 °C)   Resp 16   Ht 5' 1\" (1.549 m)   Wt 60.3 kg (133 lb)   SpO2 98%   Breastfeeding No   BMI 25.13 kg/m²     Temp (24hrs), Av.7 °F (36.5 °C), Min:97.4 °F (36.3 °C), Max:97.9 °F (36.6 °C)      Last 24hr Input/Output:  No intake or output data in the 24 hours ending 21 0918     Non stress test:  Non-reactive but appropriate for gestational age  Baseline 150, moderate variability, + accels, no decels  Contractions every 10 minutes  REPEAT [place] shows only rare contraction    Patient Vitals for the past 4 hrs:    Mode Fetal Heart Rate Fetal Activity Variability Decelerations Accelerations RN Reviewed Strip? Non Stress Test   01/07/21 0814   Present  Sylwia Carrera     01/07/21 0633 External 145  6-25 BPM None Yes Yes Reactive      Patient Vitals for the past 4 hrs: Mode Fetal Heart Rate Fetal Activity Variability Decelerations Accelerations RN Reviewed Strip? Non Stress Test   01/07/21 0814   Present  Sylwia Carrera     01/07/21 8357 External 145  6-25 BPM None Yes Yes Reactive        Uterine Activity: None     Exam:  Patient without distress.      Abdomen, fundus soft non-tender     Extremities, no redness or tenderness               Additional Exam: Patient without distress, Abdomen soft, non-tender, Fundus soft and non tender, Right upper quadrant non-tender, Perineum No sign of blood or amniotic fluid and Lower extremities edema No    Labs:     Lab Results   Component Value Date/Time    WBC 11.0 01/06/2021 01:36 PM    WBC 10.9 01/05/2021 12:19 AM    WBC 10.2 12/31/2020 12:38 PM    HGB 9.7 (L) 01/06/2021 01:36 PM    HGB 9.8 (L) 01/05/2021 12:19 AM    HGB 10.8 (L) 12/31/2020 12:38 PM    HCT 29.8 (L) 01/06/2021 01:36 PM    HCT 30.3 (L) 01/05/2021 12:19 AM    HCT 33.1 (L) 12/31/2020 12:38 PM    PLATELET 947 (L) 28/32/2218 01:36 PM    PLATELET 840 82/25/7832 12:19 AM    PLATELET 093 (L) 01/40/1715 12:38 PM       Recent Results (from the past 24 hour(s))   TYPE & SCREEN    Collection Time: 01/06/21  1:36 PM   Result Value Ref Range    Crossmatch Expiration 01/09/2021,2359     ABO/Rh(D) Johnella Wendi POSITIVE     Antibody screen NEG    CBC WITH AUTOMATED DIFF    Collection Time: 01/06/21  1:36 PM   Result Value Ref Range    WBC 11.0 3.6 - 11.0 K/uL    RBC 3.53 (L) 3.80 - 5.20 M/uL    HGB 9.7 (L) 11.5 - 16.0 g/dL    HCT 29.8 (L) 35.0 - 47.0 %    MCV 84.4 80.0 - 99.0 FL    MCH 27.5 26.0 - 34.0 PG    MCHC 32.6 30.0 - 36.5 g/dL    RDW 16.5 (H) 11.5 - 14.5 %    PLATELET 946 (L) 310 - 400 K/uL    MPV 10.5 8.9 - 12.9 FL    NRBC 0.0 0  WBC    ABSOLUTE NRBC 0.00 0.00 - 0.01 K/uL    NEUTROPHILS 76 (H) 32 - 75 %    BAND NEUTROPHILS 2 0 - 6 %    LYMPHOCYTES 15 12 - 49 %    MONOCYTES 2 (L) 5 - 13 %    EOSINOPHILS 1 0 - 7 %    BASOPHILS 0 0 - 1 %    METAMYELOCYTES 1 (H) 0 %    MYELOCYTES 3 (H) 0 %    IMMATURE GRANULOCYTES 0 %    ABS. NEUTROPHILS 8.6 (H) 1.8 - 8.0 K/UL    ABS. LYMPHOCYTES 1.7 0.8 - 3.5 K/UL    ABS. MONOCYTES 0.2 0.0 - 1.0 K/UL    ABS. EOSINOPHILS 0.1 0.0 - 0.4 K/UL    ABS. BASOPHILS 0.0 0.0 - 0.1 K/UL    ABS. IMM.  GRANS. 0.0 K/UL    DF MANUAL      RBC COMMENTS ANISOCYTOSIS  1+        RBC COMMENTS KATHI CELLS  PRESENT       GLUCOSE, GESTATIONAL 1 HR TOLERANCE    Collection Time: 01/06/21  1:36 PM   Result Value Ref Range    Glucose, gestational 1 hr. 100 65 - 140 MG/DL   HIV 1/2 AG/AB, 4TH GENERATION,W RFLX CONFIRM    Collection Time: 01/06/21  1:36 PM   Result Value Ref Range    HIV 1/2 Interpretation NONREACTIVE NR      HIV 1/2 result comment SEE NOTE     URINALYSIS W/ REFLEX CULTURE    Collection Time: 01/06/21  1:43 PM    Specimen: Urine   Result Value Ref Range    Color YELLOW/STRAW      Appearance CLEAR CLEAR      Specific gravity 1.006 1.003 - 1.030      pH (UA) 8.5 (H) 5.0 - 8.0      Protein Negative NEG mg/dL    Glucose Negative NEG mg/dL    Ketone Negative NEG mg/dL    Bilirubin Negative NEG      Blood LARGE (A) NEG      Urobilinogen 0.2 0.2 - 1.0 EU/dL    Nitrites Negative NEG      Leukocyte Esterase MODERATE (A) NEG      WBC 10-20 0 - 4 /hpf    RBC 0-5 0 - 5 /hpf    Epithelial cells FEW FEW /lpf    Bacteria 1+ (A) NEG /hpf    UA:UC IF INDICATED URINE CULTURE ORDERED (A) CNI

## 2021-01-07 NOTE — PROGRESS NOTES
Bedside and Verbal shift change report given to [address] (oncoming nurse) by Diogo Webb RN (offgoing nurse). Report included the following information SBAR, Kardex, Intake/Output, MAR and Recent Results.

## 2021-01-07 NOTE — LACTATION NOTE
Initial [facility] LC visit - This is moms 1st baby and she is unsure of her feeding plans. .  She has not had an opportunity to take any classes. Discussed the importance of breast milk for babies in general, and the extra importance and benefits for pre-term babies. Discussed pumping and storage of breast milk. Discussed family centered care and what we do in the NICU to keep the family at the center of our care. Answered moms and dads questions. Will continue to follow.

## 2021-01-07 NOTE — PROGRESS NOTES
Bedside and Verbal shift change report given to KEYANA Pascual RN (oncoming nurse) by Dieter Zimmerman RN (offgoing nurse). Report included the following information SBAR, Kardex, Intake/Output, MAR, Accordion and Recent Results.

## 2021-01-08 LAB
GESTATIONAL 3HR GTT,GESTA: NORMAL
GLUCOSE 1H P 100 G GLC PO SERPL-MCNC: 172 MG/DL (ref 65–180)
GLUCOSE P FAST SERPL-MCNC: 84 MG/DL (ref 65–95)
GLUCOSE, 2 HR,GSTT2: 132 MG/DL (ref 65–155)
GLUCOSE, 3 HR,GSTT3: 129 MG/DL (ref 65–140)

## 2021-01-08 PROCEDURE — 65410000002 HC RM PRIVATE OB

## 2021-01-08 PROCEDURE — 59025 FETAL NON-STRESS TEST: CPT

## 2021-01-08 PROCEDURE — 74011250637 HC RX REV CODE- 250/637: Performed by: OBSTETRICS & GYNECOLOGY

## 2021-01-08 PROCEDURE — 82951 GLUCOSE TOLERANCE TEST (GTT): CPT

## 2021-01-08 PROCEDURE — 74011250636 HC RX REV CODE- 250/636: Performed by: OBSTETRICS & GYNECOLOGY

## 2021-01-08 PROCEDURE — 36415 COLL VENOUS BLD VENIPUNCTURE: CPT

## 2021-01-08 RX ADMIN — SODIUM CHLORIDE, POTASSIUM CHLORIDE, SODIUM LACTATE AND CALCIUM CHLORIDE 125 ML/HR: 600; 310; 30; 20 INJECTION, SOLUTION INTRAVENOUS at 16:37

## 2021-01-08 RX ADMIN — CEPHALEXIN 500 MG: 500 CAPSULE ORAL at 00:22

## 2021-01-08 RX ADMIN — SODIUM CHLORIDE, POTASSIUM CHLORIDE, SODIUM LACTATE AND CALCIUM CHLORIDE 125 ML/HR: 600; 310; 30; 20 INJECTION, SOLUTION INTRAVENOUS at 23:44

## 2021-01-08 RX ADMIN — FERROUS SULFATE TAB 325 MG (65 MG ELEMENTAL FE) 325 MG: 325 (65 FE) TAB at 09:10

## 2021-01-08 RX ADMIN — Medication 1 TABLET: at 09:10

## 2021-01-08 RX ADMIN — CEPHALEXIN 500 MG: 500 CAPSULE ORAL at 18:10

## 2021-01-08 RX ADMIN — CEPHALEXIN 500 MG: 500 CAPSULE ORAL at 23:35

## 2021-01-08 RX ADMIN — ACETAMINOPHEN 650 MG: 325 TABLET ORAL at 23:35

## 2021-01-08 RX ADMIN — SODIUM CHLORIDE, POTASSIUM CHLORIDE, SODIUM LACTATE AND CALCIUM CHLORIDE 125 ML/HR: 600; 310; 30; 20 INJECTION, SOLUTION INTRAVENOUS at 08:14

## 2021-01-08 RX ADMIN — CEPHALEXIN 500 MG: 500 CAPSULE ORAL at 11:54

## 2021-01-08 NOTE — PROGRESS NOTES
Ante Partum Progress Note    Ingrid Elmore  26w6d    Assessment: 26w6d with vaginal bleeding concerning for partial abruption - ultrasound on  showed a 1x2cm subchorionic hematoma.     Plan:       1. Vaginal bleeding  -Suspect partial abruption  -s/p MFM ultrasound  - normal fetal growth today (48%), 1x2cm subchorionic hemorrhage; cephalic  -s/p Mag, BMZ x 2, NICU consult  -T&S done    - Another episode of bleeding overnight though less than prior bleed; pt states no bleeding currently (-), hgb repeated  and is stable from last Hgb (9.8 on ); was10.8 on admission   -NST BID  - Continue to monitor in patient until bleeding resolved and pt stable for several days  -Scant pink discharge + small amount of tissue this  AM -- > monitor overnight and plan for DC home if stable in AM        2. GBS positive 20 -  Will need PCN in labor     3. UA with reflex cx sent today due to occasional contractions on initial NST (which resolved) and c/w UTI  - GBS+ UTI -- will continue Keflex due to symptoms     4. Routine prenatal care  -1 hour , 3 hr GTT WNL        Orders/Charges: High and Non Stress Test  X 1     Patient states she has no new complaints. Cramping has resolved. Scant spotting noted. Did pass small amount of tissue this AM.  No  LOF. +FM. Would like to go home. Vitals:  Visit Vitals  BP (!) 103/58 (BP 1 Location: Right arm, BP Patient Position: At rest) Comment: nurse notified   Pulse (!) 101   Temp 97.6 °F (36.4 °C)   Resp 16   Ht 5' 1\" (1.549 m)   Wt 60.3 kg (133 lb)   SpO2 98%   Breastfeeding No   BMI 25.13 kg/m²     Temp (24hrs), Av °F (36.7 °C), Min:97.6 °F (36.4 °C), Max:98.2 °F (36.8 °C)      Last 24hr Input/Output:  No intake or output data in the 24 hours ending 21 1232     Non stress test:  Non-reactive but appropriate for gestational age    Patient Vitals for the past 4 hrs:    Mode Fetal Heart Rate Fetal Activity Variability Decelerations Accelerations RN Reviewed Strip? Non Stress Test   01/08/21 0916 External 135 Present 6-25 BPM Variable Yes Yes Reactive      Patient Vitals for the past 4 hrs: Mode Fetal Heart Rate Fetal Activity Variability Decelerations Accelerations RN Reviewed Strip? Non Stress Test   01/08/21 0916 External 135 Present 6-25 BPM Variable Yes Yes Reactive        Uterine Activity: None     Exam:  Patient without distress.      Abdomen, fundus soft non-tender     Extremities, no redness or tenderness               Additional Exam: Deferred    Labs:     Lab Results   Component Value Date/Time    WBC 11.0 01/06/2021 01:36 PM    WBC 10.9 01/05/2021 12:19 AM    WBC 10.2 12/31/2020 12:38 PM    HGB 9.7 (L) 01/06/2021 01:36 PM    HGB 9.8 (L) 01/05/2021 12:19 AM    HGB 10.8 (L) 12/31/2020 12:38 PM    HCT 29.8 (L) 01/06/2021 01:36 PM    HCT 30.3 (L) 01/05/2021 12:19 AM    HCT 33.1 (L) 12/31/2020 12:38 PM    PLATELET 286 (L) 06/32/2547 01:36 PM    PLATELET 288 97/74/4686 12:19 AM    PLATELET 327 (L) 31/11/7463 12:38 PM       Recent Results (from the past 24 hour(s))   GLUCOSE, GESTATIONAL, 3 HR TOLERANCE    Collection Time: 01/08/21  5:54 AM   Result Value Ref Range    GESTATIONAL 3HR GTT          Glucose fasting, gestational 84 65 - 95 MG/DL    GLUCOSE, 1  65 - 180 MG/DL    GLUCOSE, 2  65 - 155 MG/DL    GLUCOSE, 3  65 - 140 MG/DL

## 2021-01-08 NOTE — PROGRESS NOTES
8905 Verbal shift change report given to Soheila Valladares RN (oncoming nurse) by Elisabeth Reed RN (offgoing nurse). Report included the following information SBAR.

## 2021-01-08 NOTE — PROGRESS NOTES
0315 Bedside shift change report given to Ike Barriga RN (oncoming nurse) by Elaina Burris RN (offgoing nurse). Report included the following information SBAR, Intake/Output and MAR.     0800 Lab drawn and sent to lab. [phone] Dr Kaila Wang into see patient. 1155 Dr Kaila Wang given patient's three hour glucola test results.

## 2021-01-09 VITALS
HEIGHT: 61 IN | HEART RATE: 83 BPM | SYSTOLIC BLOOD PRESSURE: 92 MMHG | OXYGEN SATURATION: 98 % | WEIGHT: 133 LBS | TEMPERATURE: 97.8 F | BODY MASS INDEX: 25.11 KG/M2 | DIASTOLIC BLOOD PRESSURE: 58 MMHG | RESPIRATION RATE: 16 BRPM

## 2021-01-09 PROCEDURE — 74011250636 HC RX REV CODE- 250/636: Performed by: OBSTETRICS & GYNECOLOGY

## 2021-01-09 PROCEDURE — 74011250637 HC RX REV CODE- 250/637: Performed by: OBSTETRICS & GYNECOLOGY

## 2021-01-09 PROCEDURE — 59025 FETAL NON-STRESS TEST: CPT

## 2021-01-09 RX ORDER — CEPHALEXIN 500 MG/1
500 CAPSULE ORAL 4 TIMES DAILY
Qty: 16 CAP | Refills: 0 | Status: SHIPPED | OUTPATIENT
Start: 2021-01-09 | End: 2021-01-12

## 2021-01-09 RX ADMIN — SODIUM CHLORIDE, POTASSIUM CHLORIDE, SODIUM LACTATE AND CALCIUM CHLORIDE 125 ML/HR: 600; 310; 30; 20 INJECTION, SOLUTION INTRAVENOUS at 08:14

## 2021-01-09 RX ADMIN — Medication 1 TABLET: at 10:06

## 2021-01-09 RX ADMIN — FERROUS SULFATE TAB 325 MG (65 MG ELEMENTAL FE) 325 MG: 325 (65 FE) TAB at 10:07

## 2021-01-09 RX ADMIN — CEPHALEXIN 500 MG: 500 CAPSULE ORAL at 06:24

## 2021-01-09 NOTE — DISCHARGE SUMMARY
Antepartum  Discharge Summary     Patient ID:  Darryl Oneal  [id number]  31 y.o.  1990    Admit date: 2020    Discharge date: 2021    Admission Diagnoses:    Patient Active Problem List   Diagnosis Code    Vaginal bleeding in pregnancy O46.90       Discharge Diagnoses: No discharge information exists for this patient. Patient Active Problem List   Diagnosis Code    Vaginal bleeding in pregnancy O46.90       Procedures for this admission: Pt was admitted, observed, hydrated, started on antibiotics for a UTI and had an ultrasound done. She also had fetal monitoring. Hospital Course:  Pt was admitted with vaginal bleeding thought to be due to a marginal abruption. She was obsevered, given steroids and magnesium. She had an ultrasound that showed a normally sized baby and had no evidence of  labor. She was observed for several days after her bleeding stopped and was noted to be stable for discharge home. She was found to be GBS+ and had a GBS UTI which was treated with Keflex. Her 1 hr glucola was abnormal but her 3hr GTT was normal.     Disposition: Home or self care    Discharged Condition: stable            Patient Instructions:   Current Discharge Medication List      START taking these medications    Details   cephALEXin (KEFLEX) 500 mg capsule Take 1 Cap by mouth four (4) times daily for 4 days. Qty: 16 Cap, Refills: 0         CONTINUE these medications which have NOT CHANGED    Details   prenatal vit calc,iron,folic (PRENATAL VITAMIN PO) Take 1 Tab by mouth nightly. Indications: pregnancy           Activity: Activity as tolerated  Diet: Regular Diet    Follow-up with   Follow-up Appointments   Procedures    FOLLOW UP VISIT Appointment in: One Week As scheduled with Dr. Shelia Valladares     As scheduled with Dr. Shelia Valladares     Standing Status:   Standing     Number of Occurrences:   1     Order Specific Question:   Appointment in     Answer:    One Week        Signed:  Barry Oropeza MD  1/9/2021  10:21 AM

## 2021-01-09 NOTE — PROGRESS NOTES
Ante Partum Progress Note    Ingrid Elmore  27w0d    Assessment: 27w0d with vaginal bleeding concerning for partial abruption - ultrasound on  showed a 1x2cm subchorionic hematoma. Now without bleeding for several days.       Plan:       1. Vaginal bleeding  -Suspect partial abruption  -s/p MFM ultrasound  - normal fetal growth today (48%), 1x2cm subchorionic hemorrhage; cephalic  -s/p Mag, BMZ x 2, NICU consult  -T&S done    - Another episode of bleeding overnight though less than prior bleed; pt states no bleeding currently (-), hgb repeated  and is stable from last Hgb (9.8 on ); was10.8 on admission   -NST BID  - Continue to monitor in patient until bleeding resolved and pt stable for several days  -10/9 No bleeding overnight.        2. GBS positive 20 -  Will need PCN in labor     3. UA with reflex cx sent today due to occasional contractions on initial NST (which resolved) and c/w UTI  - GBS+ UTI -- will continue Keflex due to symptoms --> plan to DC home with remainder of Keflex rx.     4. Routine prenatal care  -1 hour , 3 hr GTT WNL        Orders/Charges: Medium and Non Stress Test  X 1     Patient states she has no new complaints. Cramping has resolved. No bleeding over night. Some LBP on the left. Good fetal movement. Feels ready for discharge home.       Vitals:  Visit Vitals  BP (!) 92/58 (BP 1 Location: Right arm, BP Patient Position: At rest)   Pulse 83   Temp 97.8 °F (36.6 °C)   Resp 16   Ht 5' 1\" (1.549 m)   Wt 60.3 kg (133 lb)   SpO2 98%   Breastfeeding No   BMI 25.13 kg/m²     Temp (24hrs), Av °F (36.7 °C), Min:97.3 °F (36.3 °C), Max:98.5 °F (36.9 °C)      Last 24hr Input/Output:  No intake or output data in the 24 hours ending 21 1018     Non stress test:  Non-reactive but appropriate for gestational age    Patient Vitals for the past 4 hrs: Mode Fetal Heart Rate Variability Decelerations Accelerations RN Reviewed Strip?  Non Stress Test 01/09/21 0858 External 140 6-25 BPM None Yes Yes Reactive   01/09/21 0822 External 140    Yes       Patient Vitals for the past 4 hrs: Mode Fetal Heart Rate Variability Decelerations Accelerations RN Reviewed Strip? Non Stress Test   01/09/21 0858 External 140 6-25 BPM None Yes Yes Reactive   01/09/21 0822 External 140    Yes         Uterine Activity: None     Exam:  Patient without distress. Abdomen, fundus soft non-tender     Extremities, no redness or tenderness               Additional Exam: Deferred    Labs:     Lab Results   Component Value Date/Time    WBC 11.0 01/06/2021 01:36 PM    WBC 10.9 01/05/2021 12:19 AM    WBC 10.2 12/31/2020 12:38 PM    HGB 9.7 (L) 01/06/2021 01:36 PM    HGB 9.8 (L) 01/05/2021 12:19 AM    HGB 10.8 (L) 12/31/2020 12:38 PM    HCT 29.8 (L) 01/06/2021 01:36 PM    HCT 30.3 (L) 01/05/2021 12:19 AM    HCT 33.1 (L) 12/31/2020 12:38 PM    PLATELET 217 (L) 56/92/9447 01:36 PM    PLATELET 735 33/36/0693 12:19 AM    PLATELET 524 (L) 20/16/1616 12:38 PM       No results found for this or any previous visit (from the past 24 hour(s)).

## 2021-01-09 NOTE — PROGRESS NOTES
..Bedside and Verbal shift change report given to Nhung Hanley RN (oncoming nurse) by Delroy Dove RN (offgoing nurse). Report included the following information SBAR, Kardex, Intake/Output, MAR, Accordion and Recent Results.

## 2021-01-09 NOTE — DISCHARGE INSTRUCTIONS
Patient Education        Vaginal Bleeding During Pregnancy: Care Instructions  Your Care Instructions     Many women have some vaginal bleeding when they are pregnant. In some cases, the bleeding is not serious. And there aren't any more problems with the pregnancy. But sometimes bleeding is a sign of a more serious problem. This is more common if the bleeding is heavy or painful. Examples of more serious problems include miscarriage, an ectopic pregnancy, or a problem with the placenta. You may have to see your doctor again to be sure everything is okay. You may also need more tests to find the cause of the bleeding. For some women, home treatment is all they need. But it depends on what is causing the bleeding. Be sure to tell your doctor if you have any new symptoms or if your symptoms get worse. The doctor has checked you carefully, but problems can develop later. If you notice any problems or new symptoms, get medical treatment right away. Follow-up care is a key part of your treatment and safety. Be sure to make and go to all appointments, and call your doctor if you are having problems. It's also a good idea to know your test results and keep a list of the medicines you take. How can you care for yourself at home? · If your doctor prescribed medicines, take them exactly as directed. Call your doctor if you think you are having a problem with your medicine. · Do not have sex until the bleeding stops and your doctor says it's okay. · Ask your doctor about other activities you can or can't do. · Get a lot of rest. Being pregnant can make you tired. · Eat a healthy diet. Include a lot of peas, beans, and leafy green vegetables. Talk to a dietitian if you need help planning your diet. · Do not use nonsteroidal anti-inflammatory drugs (NSAIDs), such as ibuprofen (Advil, Motrin), naproxen (Aleve), or aspirin, unless your doctor says it is okay. When should you call for help?    Call 911 anytime you think you may need emergency care. For example, call if:    · You passed out (lost consciousness).     · You have severe vaginal bleeding. Call your doctor now or seek immediate medical care if:    · You have any vaginal bleeding.     · You have pain in your belly or pelvis.     · You think that you are in labor.     · You have a sudden release of fluid from your vagina.     · You notice that your baby has stopped moving or is moving much less than normal.   Watch closely for changes in your health, and be sure to contact your doctor if you have any questions or concerns. Where can you learn more? Go to http://www.gray.com/  Enter L105 in the search box to learn more about \"Vaginal Bleeding During Pregnancy: Care Instructions. \"  Current as of: 2020               Content Version: 12.6  © 2742-4465 [employer]. Care instructions adapted under license by [employer] (which disclaims liability or warranty for this information). If you have questions about a medical condition or this instruction, always ask your healthcare professional. [place] [address] any warranty or liability for your use of this information. Patient Education        Weeks 26 to 30 of Your Pregnancy: Care Instructions  Your Care Instructions     You are now in your last trimester of pregnancy. Your baby is growing rapidly. And you'll probably feel your baby moving around more often. Your doctor may ask you to count your baby's kicks. Your back may ache as your body gets used to your baby's size and length. If you haven't already had the Tdap shot during this pregnancy, talk to your doctor about getting it. It will help protect your  against pertussis infection. During this time, it's important to take care of yourself and pay attention to what your body needs. If you feel sexual, explore ways to be close with your partner that match your comfort and desire. Use the tips provided in this care sheet to find ways to be sexual in your own way. Follow-up care is a key part of your treatment and safety. Be sure to make and go to all appointments, and call your doctor if you are having problems. It's also a good idea to know your test results and keep a list of the medicines you take. How can you care for yourself at home? Take it easy at work  · Take frequent breaks. If possible, stop working when you are tired, and rest during your lunch hour. · Take bathroom breaks every 2 hours. · Change positions often. If you sit for long periods, stand up and walk around. · When you stand for a long time, keep one foot on a low stool with your knee bent. After standing a lot, sit with your feet up. · Avoid fumes, chemicals, and tobacco smoke. Be sexual in your own way  · Having sex during pregnancy is okay, unless your doctor tells you not to. · You may be very interested in sex, or you may have no interest at all. · Your growing belly can make it hard to find a good position during intercourse. [place] and explore. · You may get cramps in your uterus when your partner touches your breasts. · A back rub may relieve the backache or cramps that sometimes follow orgasm. Learn about  labor  · Watch for signs of  labor. You may be going into labor if:  ? You have menstrual-like cramps, with or without nausea. ? You have about 6 or more contractions in 1 hour, even after you have had a glass of water and are resting. ? You have a low, dull backache that does not go away when you change your position. ? You have pain or pressure in your pelvis that comes and goes in a pattern. ? You have intestinal cramping or flu-like symptoms, with or without diarrhea.  ? You notice an increase or change in your vaginal discharge. Discharge may be heavy, mucus-like, watery, or streaked with blood. ? Your water breaks.   · If you think you have  labor:  ? Drink 2 or 3 glasses of water or juice. Not drinking enough fluids can cause contractions. ? Stop what you are doing, and empty your bladder. Then lie down on your left side for at least 1 hour. ? While lying on your side, find your breast bone. Put your fingers in the soft spot just below it. Move your fingers down toward your belly button to find the top of your uterus. Check to see if it is tight. ? Contractions can be weak or strong. Record your contractions for an hour. Time a contraction from the start of one contraction to the start of the next one.  ? Single or several strong contractions without a pattern are called Tj-Rand contractions. They are practice contractions but not the start of labor. They often stop if you change what you are doing. ? Call your doctor if you have regular contractions. Where can you learn more? Go to http://www.gray.com/  Enter A990 in the search box to learn more about \"Weeks 26 to 30 of Your Pregnancy: Care Instructions. \"  Current as of: February 11, 2020               Content Version: 12.6  © 2529-7021 [employer]. Care instructions adapted under license by [employer] (which disclaims liability or warranty for this information). If you have questions about a medical condition or this instruction, always ask your healthcare professional. [address] any warranty or liability for your use of this information.

## 2021-01-09 NOTE — PROGRESS NOTES
Bedside shift change report given to TANESHA Guzman RN (oncoming nurse) by Honey Carlisle RN (offgoing nurse). Report included the following information SBAR, MAR.     0945 patient requesting to hold prenatal and iron medication until after her breakfast is brought to her by family. 1045 Discharge instructions reviewed and signed. All questions answered. 1145 patient taken to main entrance via wheelchair for discharge with  and belongings.

## 2021-01-10 ENCOUNTER — ANESTHESIA (OUTPATIENT)
Dept: LABOR AND DELIVERY | Age: 31
End: 2021-01-10
Payer: COMMERCIAL

## 2021-01-10 ENCOUNTER — HOSPITAL ENCOUNTER (INPATIENT)
Age: 31
LOS: 2 days | Discharge: HOME | End: 2021-01-12
Attending: OBSTETRICS & GYNECOLOGY | Admitting: OBSTETRICS & GYNECOLOGY
Payer: COMMERCIAL

## 2021-01-10 ENCOUNTER — ANESTHESIA EVENT (OUTPATIENT)
Dept: LABOR AND DELIVERY | Age: 31
End: 2021-01-10
Payer: COMMERCIAL

## 2021-01-10 PROBLEM — Z3A.27 27 WEEKS GESTATION OF PREGNANCY: Status: ACTIVE | Noted: 2021-01-10

## 2021-01-10 PROBLEM — O60.00 PRETERM LABOR: Status: ACTIVE | Noted: 2021-01-10

## 2021-01-10 LAB
BASOPHILS # BLD: 0 K/UL (ref 0–0.1)
BASOPHILS NFR BLD: 0 % (ref 0–1)
DIFFERENTIAL METHOD BLD: ABNORMAL
EOSINOPHIL # BLD: 0 K/UL (ref 0–0.4)
EOSINOPHIL NFR BLD: 0 % (ref 0–7)
ERYTHROCYTE [DISTWIDTH] IN BLOOD BY AUTOMATED COUNT: 16.5 % (ref 11.5–14.5)
HCT VFR BLD AUTO: 34.2 % (ref 35–47)
HGB BLD-MCNC: 11.5 G/DL (ref 11.5–16)
IMM GRANULOCYTES # BLD AUTO: 0 K/UL
IMM GRANULOCYTES NFR BLD AUTO: 0 %
LYMPHOCYTES # BLD: 2 K/UL (ref 0.8–3.5)
LYMPHOCYTES NFR BLD: 13 % (ref 12–49)
MCH RBC QN AUTO: 26.9 PG (ref 26–34)
MCHC RBC AUTO-ENTMCNC: 33.6 G/DL (ref 30–36.5)
MCV RBC AUTO: 80.1 FL (ref 80–99)
MONOCYTES # BLD: 1.1 K/UL (ref 0–1)
MONOCYTES NFR BLD: 7 % (ref 5–13)
MYELOCYTES NFR BLD MANUAL: 1 %
NEUTS SEG # BLD: 11.8 K/UL (ref 1.8–8)
NEUTS SEG NFR BLD: 77 % (ref 32–75)
NRBC # BLD: 0 K/UL (ref 0–0.01)
NRBC BLD-RTO: 0 PER 100 WBC
PLATELET # BLD AUTO: 163 K/UL (ref 150–400)
PMV BLD AUTO: 10 FL (ref 8.9–12.9)
PROMYELOCYTES NFR BLD MANUAL: 2 %
RBC # BLD AUTO: 4.27 M/UL (ref 3.8–5.2)
RBC MORPH BLD: ABNORMAL
WBC # BLD AUTO: 15.3 K/UL (ref 3.6–11)

## 2021-01-10 PROCEDURE — 85025 COMPLETE CBC W/AUTO DIFF WBC: CPT

## 2021-01-10 PROCEDURE — 88307 TISSUE EXAM BY PATHOLOGIST: CPT

## 2021-01-10 PROCEDURE — 75810000275 HC EMERGENCY DEPT VISIT NO LEVEL OF CARE

## 2021-01-10 PROCEDURE — 74011250636 HC RX REV CODE- 250/636: Performed by: ADVANCED PRACTICE MIDWIFE

## 2021-01-10 PROCEDURE — 74011000250 HC RX REV CODE- 250: Performed by: ANESTHESIOLOGY

## 2021-01-10 PROCEDURE — 74011000258 HC RX REV CODE- 258: Performed by: ADVANCED PRACTICE MIDWIFE

## 2021-01-10 PROCEDURE — 75410000002 HC LABOR FEE PER 1 HR

## 2021-01-10 PROCEDURE — 65270000029 HC RM PRIVATE

## 2021-01-10 PROCEDURE — 77030005513 HC CATH URETH FOL11 MDII -B

## 2021-01-10 PROCEDURE — 10907ZC DRAINAGE OF AMNIOTIC FLUID, THERAPEUTIC FROM PRODUCTS OF CONCEPTION, VIA NATURAL OR ARTIFICIAL OPENING: ICD-10-PCS | Performed by: OBSTETRICS & GYNECOLOGY

## 2021-01-10 PROCEDURE — 74011250636 HC RX REV CODE- 250/636: Performed by: ANESTHESIOLOGY

## 2021-01-10 PROCEDURE — 77030019905 HC CATH URETH INTMIT MDII -A

## 2021-01-10 PROCEDURE — 75410000000 HC DELIVERY VAGINAL/SINGLE

## 2021-01-10 PROCEDURE — 99283 EMERGENCY DEPT VISIT LOW MDM: CPT

## 2021-01-10 PROCEDURE — 74011250637 HC RX REV CODE- 250/637: Performed by: ADVANCED PRACTICE MIDWIFE

## 2021-01-10 PROCEDURE — 36415 COLL VENOUS BLD VENIPUNCTURE: CPT

## 2021-01-10 PROCEDURE — 76060000078 HC EPIDURAL ANESTHESIA

## 2021-01-10 PROCEDURE — 75410000003 HC RECOV DEL/VAG/CSECN EA 0.5 HR

## 2021-01-10 RX ORDER — NALOXONE HYDROCHLORIDE 0.4 MG/ML
0.4 INJECTION, SOLUTION INTRAMUSCULAR; INTRAVENOUS; SUBCUTANEOUS AS NEEDED
Status: DISCONTINUED | OUTPATIENT
Start: 2021-01-10 | End: 2021-01-11 | Stop reason: HOSPADM

## 2021-01-10 RX ORDER — EPHEDRINE SULFATE/0.9% NACL/PF 50 MG/5 ML
SYRINGE (ML) INTRAVENOUS
Status: DISPENSED
Start: 2021-01-10 | End: 2021-01-11

## 2021-01-10 RX ORDER — TERBUTALINE SULFATE 1 MG/ML
0.25 INJECTION SUBCUTANEOUS AS NEEDED
Status: DISCONTINUED | OUTPATIENT
Start: 2021-01-10 | End: 2021-01-11 | Stop reason: HOSPADM

## 2021-01-10 RX ORDER — MISOPROSTOL 200 UG/1
TABLET ORAL
Status: DISPENSED
Start: 2021-01-10 | End: 2021-01-11

## 2021-01-10 RX ORDER — OXYTOCIN/RINGER'S LACTATE 30/500 ML
10 PLASTIC BAG, INJECTION (ML) INTRAVENOUS AS NEEDED
Status: DISCONTINUED | OUTPATIENT
Start: 2021-01-10 | End: 2021-01-11 | Stop reason: HOSPADM

## 2021-01-10 RX ORDER — FENTANYL/BUPIVACAINE/NS/PF 2-1250MCG
1-16 PREFILLED PUMP RESERVOIR EPIDURAL CONTINUOUS
Status: DISCONTINUED | OUTPATIENT
Start: 2021-01-10 | End: 2021-01-12 | Stop reason: HOSPADM

## 2021-01-10 RX ORDER — BUPIVACAINE HYDROCHLORIDE 5 MG/ML
30 INJECTION, SOLUTION EPIDURAL; INTRACAUDAL AS NEEDED
Status: DISCONTINUED | OUTPATIENT
Start: 2021-01-10 | End: 2021-01-11 | Stop reason: HOSPADM

## 2021-01-10 RX ORDER — MAGNESIUM SULFATE HEPTAHYDRATE 40 MG/ML
4 INJECTION, SOLUTION INTRAVENOUS ONCE
Status: COMPLETED | OUTPATIENT
Start: 2021-01-10 | End: 2021-01-10

## 2021-01-10 RX ORDER — BUPIVACAINE HYDROCHLORIDE 2.5 MG/ML
INJECTION, SOLUTION EPIDURAL; INFILTRATION; INTRACAUDAL AS NEEDED
Status: DISCONTINUED | OUTPATIENT
Start: 2021-01-10 | End: 2021-01-10 | Stop reason: HOSPADM

## 2021-01-10 RX ORDER — SODIUM CHLORIDE, SODIUM LACTATE, POTASSIUM CHLORIDE, CALCIUM CHLORIDE 600; 310; 30; 20 MG/100ML; MG/100ML; MG/100ML; MG/100ML
75 INJECTION, SOLUTION INTRAVENOUS CONTINUOUS
Status: DISCONTINUED | OUTPATIENT
Start: 2021-01-10 | End: 2021-01-12 | Stop reason: HOSPADM

## 2021-01-10 RX ORDER — ONDANSETRON 2 MG/ML
4 INJECTION INTRAMUSCULAR; INTRAVENOUS
Status: DISCONTINUED | OUTPATIENT
Start: 2021-01-10 | End: 2021-01-11 | Stop reason: HOSPADM

## 2021-01-10 RX ORDER — FENTANYL CITRATE 50 UG/ML
INJECTION, SOLUTION INTRAMUSCULAR; INTRAVENOUS AS NEEDED
Status: DISCONTINUED | OUTPATIENT
Start: 2021-01-10 | End: 2021-01-10 | Stop reason: HOSPADM

## 2021-01-10 RX ORDER — EPHEDRINE SULFATE/0.9% NACL/PF 50 MG/5 ML
12.5 SYRINGE (ML) INTRAVENOUS
Status: DISCONTINUED | OUTPATIENT
Start: 2021-01-10 | End: 2021-01-11 | Stop reason: HOSPADM

## 2021-01-10 RX ORDER — MISOPROSTOL 200 UG/1
600 TABLET ORAL ONCE
Status: COMPLETED | OUTPATIENT
Start: 2021-01-10 | End: 2021-01-10

## 2021-01-10 RX ORDER — FENTANYL CITRATE 50 UG/ML
100 INJECTION, SOLUTION INTRAMUSCULAR; INTRAVENOUS ONCE
Status: COMPLETED | OUTPATIENT
Start: 2021-01-10 | End: 2021-01-10

## 2021-01-10 RX ORDER — DIPHENHYDRAMINE HYDROCHLORIDE 50 MG/ML
12.5 INJECTION, SOLUTION INTRAMUSCULAR; INTRAVENOUS
Status: DISCONTINUED | OUTPATIENT
Start: 2021-01-10 | End: 2021-01-11 | Stop reason: HOSPADM

## 2021-01-10 RX ORDER — OXYTOCIN/RINGER'S LACTATE 30/500 ML
87.3 PLASTIC BAG, INJECTION (ML) INTRAVENOUS AS NEEDED
Status: DISCONTINUED | OUTPATIENT
Start: 2021-01-10 | End: 2021-01-11 | Stop reason: HOSPADM

## 2021-01-10 RX ADMIN — BUPIVACAINE HYDROCHLORIDE 2 ML: 2.5 INJECTION, SOLUTION EPIDURAL; INFILTRATION; INTRACAUDAL; PERINEURAL at 13:10

## 2021-01-10 RX ADMIN — BUPIVACAINE HYDROCHLORIDE 2 ML: 2.5 INJECTION, SOLUTION EPIDURAL; INFILTRATION; INTRACAUDAL; PERINEURAL at 13:09

## 2021-01-10 RX ADMIN — FENTANYL CITRATE 100 MCG: 50 INJECTION, SOLUTION INTRAMUSCULAR; INTRAVENOUS at 13:07

## 2021-01-10 RX ADMIN — Medication 10 ML/HR: at 13:05

## 2021-01-10 RX ADMIN — SODIUM CHLORIDE 5 MILLION UNITS: 900 INJECTION INTRAVENOUS at 13:12

## 2021-01-10 RX ADMIN — MAGNESIUM SULFATE HEPTAHYDRATE 4 G: 40 INJECTION, SOLUTION INTRAVENOUS at 13:13

## 2021-01-10 RX ADMIN — MAGNESIUM SULFATE HEPTAHYDRATE 2 G/HR: 500 INJECTION, SOLUTION INTRAMUSCULAR; INTRAVENOUS at 13:32

## 2021-01-10 RX ADMIN — SODIUM CHLORIDE, POTASSIUM CHLORIDE, SODIUM LACTATE AND CALCIUM CHLORIDE 75 ML/HR: 600; 310; 30; 20 INJECTION, SOLUTION INTRAVENOUS at 13:30

## 2021-01-10 RX ADMIN — SODIUM CHLORIDE 2.5 MILLION UNITS: 9 INJECTION, SOLUTION INTRAVENOUS at 20:41

## 2021-01-10 RX ADMIN — LIDOCAINE HYDROCHLORIDE 3 ML: 10; .005 INJECTION, SOLUTION EPIDURAL; INFILTRATION; INTRACAUDAL; PERINEURAL at 13:08

## 2021-01-10 RX ADMIN — Medication 10000 MILLI-UNITS: at 21:50

## 2021-01-10 RX ADMIN — SODIUM CHLORIDE, POTASSIUM CHLORIDE, SODIUM LACTATE AND CALCIUM CHLORIDE 1000 ML: 600; 310; 30; 20 INJECTION, SOLUTION INTRAVENOUS at 12:50

## 2021-01-10 RX ADMIN — LIDOCAINE HYDROCHLORIDE 2 ML: 10; .005 INJECTION, SOLUTION EPIDURAL; INFILTRATION; INTRACAUDAL; PERINEURAL at 13:06

## 2021-01-10 RX ADMIN — MISOPROSTOL 600 MCG: 200 TABLET ORAL at 21:55

## 2021-01-10 RX ADMIN — OXYTOCIN 87.3 MILLI-UNITS/MIN: 10 INJECTION, SOLUTION INTRAMUSCULAR; INTRAVENOUS at 22:20

## 2021-01-10 RX ADMIN — MAGNESIUM SULFATE HEPTAHYDRATE 2 G/HR: 500 INJECTION, SOLUTION INTRAMUSCULAR; INTRAVENOUS at 18:25

## 2021-01-10 RX ADMIN — SODIUM CHLORIDE 2.5 MILLION UNITS: 9 INJECTION, SOLUTION INTRAVENOUS at 16:36

## 2021-01-10 NOTE — PROGRESS NOTES
Labor Progress Note  Patient seen, fetal heart rate and contraction pattern evaluated, patient examined. Resting comfortably S/P Epidural  Denies pain, pressure or ROM  Visit Vitals  BP (!) 85/49 Comment: pt sleeping   Pulse (!) 114   Temp 98.3 °F (36.8 °C)   Resp 16   Ht 5' 1\" (1.549 m)   Wt 132 lb 4.4 oz (60 kg)   SpO2 98%   Breastfeeding No   BMI 24.99 kg/m²       Physical Exam:  Cervical Exam:  8-9 cm dilated    100% effaced    -1 station    Presenting Part: vtx with hand/fingers moving beside head  Cervical Position: anterior  Consistency: Soft  Membranes:  BBOW  Uterine Activity: Frequency: irregular and infrequent and Intensity: moderate  Fetal Heart Rate: Baseline: 140 per minute, Cat I  Variability: moderate  Accelerations: yes  Decelerations: none    Assessment/Plan:  Reassuring fetal status, Labor  Continue expectant management, Continue plan for vaginal delivery   Recheck with SROM or urge to push  Continue Magnesium x 12 hrs  Continue PCN for GBS prophylaxis    NALINI Erwin/CARLO

## 2021-01-10 NOTE — ANESTHESIA PROCEDURE NOTES
Epidural Block    Patient location during procedure: OB  Start time: 1/10/2021 12:56 PM  End time: 1/10/2021 1:10 PM  Reason for block: labor epidural  Staffing  Performed: attending   Preanesthetic Checklist  Completed: patient identified, IV checked, site marked, risks and benefits discussed, surgical consent, monitors and equipment checked, pre-op evaluation and timeout performed  Block Placement  Patient position: left lateral decubitus  Prep: DuraPrep  Sterility prep: cap, drape, gloves and mask  Sedation level: no sedation  Patient monitoring: continuous pulse oximetry and heart rate  Approach: midline  Location: lumbar  Lumbar location: L3-L4  Epidural  Loss of resistance technique: air  Guidance: landmark technique  Needle  Needle type: Tuohy   Needle gauge: 17 G  Needle length: 9 cm  Needle insertion depth: 7 cm  Catheter type: end hole  Catheter size: 19 G  Catheter at skin depth: 12 cm  Catheter securement method: clear occlusive dressing and surgical tape  Test dose: negative  Assessment  Sensory level: T10  Block outcome: pain improved  Number of attempts: 1  Procedure assessment: patient tolerated procedure well with no complications

## 2021-01-10 NOTE — PROGRESS NOTES
1210. Pt arrived from home c/o back and abdominal pain. Pt has brown spotting with wiping.     1230. OTONIEL Andino cnm at bedside. sve and pt 8cm and 0 station with bulging bag. Pt being admitted to room 4 stat.    1235. TRANSFER - IN REPORT:    Verbal report received from brenton park rn(name) on Ingrid Elmore  being received from [facility](unit) for routine progression of care      Report consisted of patients Situation, Background, Assessment and   Recommendations(SBAR). Information from the following report(s) SBAR was reviewed with the receiving nurse. Opportunity for questions and clarification was provided. Assessment completed upon patients arrival to unit and care assumed. 1246. IV placed without difficulty. Blood work collected and sent to lab.     1259. Dr. Durbin at bedside for epidural placement. 1304. Epidural placed by dr. Durbin. Pt tolerated well.     1312. 5 million IV penicillin started @ 200ml/hour. 1313. 4gram IV magnesium loading dose given @ 200ml/hour. 1332. 2gram IV magnesium started @ 50ml/hour. 1401. OTONIEL Andino cnm at bedside. Bedside ultrasound performed to confirm head down. Pt resting quietly.    1930. Bedside and Verbal shift change report given to andrea Castillo rn (oncoming nurse) by brenton Park rn (offgoing nurse). Report included the following information SBAR, Intake/Output, MAR and Recent Results.

## 2021-01-10 NOTE — H&P
History & Physical    Name: Taj Alarcon MRN: [id number]  SSN: [id number]    YOB: 1990  Age: 31 y.o. Sex: female        Subjective:     Estimated Date of Delivery: 4/10/21  OB History        2    Para   0    Term   0       0    AB   1    Living   0       SAB   1    TAB   0    Ectopic   0    Molar   0    Multiple   0    Live Births   0                MsAnthony Bishop is admitted with pregnancy at 27w1d for active labor. Prenatal course was complicated by  labor Abruptio/MARGI. Please see prenatal records for details. Opos  ABS neg  Rubella immune  Heb B neg  HIV neg  TPA neg  GCC neg/neg  GBS pos    Patient Active Problem List    Diagnosis     labor    27 weeks gestation of pregnancy    Vaginal bleeding in pregnancy     No specialty comments available. No past medical history on file. Past Surgical History:   Procedure Laterality Date    HX OTHER SURGICAL  2019    D&C     Social History     Occupational History    Not on file   Tobacco Use    Smoking status: Never Smoker    Smokeless tobacco: Never Used   Substance and Sexual Activity    Alcohol use: Not Currently    Drug use: Never    Sexual activity: Not on file     No family history on file. No Known Allergies  Prior to Admission medications    Medication Sig Start Date End Date Taking? Authorizing Provider   cephALEXin (KEFLEX) 500 mg capsule Take 1 Cap by mouth four (4) times daily for 4 days. 21  Misti Alonzo MD   prenatal vit calc,iron,folic (PRENATAL VITAMIN PO) Take 1 Tab by mouth nightly. Indications: pregnancy    Mirna Mckeon MD        Review of Systems: A comprehensive review of systems was negative except for that written in the HPI. Objective:     Vitals:  Vitals:    01/10/21 1218   BP: 114/71   Pulse: 99   Resp: 16   Temp: 98 °F (36.7 °C)   Weight: 132 lb 4.4 oz (60 kg)   Height: 5' 1\" (1.549 m)        Physical Exam:  Patient without distress.   Abdomen: soft, nontender  Fundus: soft and non tender  Perineum: blood present, amniotic fluid absent  Cervical Exam: 8 cm dilated    90% effaced    -1 station    Presenting Part: cephalic  Cervical Position: mid position  Consistency: Soft  Lower Extremities:  - Edema No  Membranes:  BBOW  Fetal Heart Rate: Reactive  Baseline: 150 per minute  Variability: moderate  Accelerations: yes  Decelerations: none  Uterine contractions: regular, every 4 minutes    Prenatal Labs:   Lab Results   Component Value Date/Time    GrBStrep, External Positive 2020 05:20 PM        Assessment/Plan:     Plan: Admit for Reassuring fetal status, Labor  Continue expectant management, Continue plan for vaginal delivery. Group B Strep was positive, will treat prophylactically with penicillin.   Magnesium for Neuro protection now  Completed Steroid course -  NICU consult  Epidural  MD [facility] aware  BS sono after comfortable with Epidural to confirm  Anticiapte       Signed By:  Justo Hardy NP     January 10, 2021

## 2021-01-10 NOTE — ED PROVIDER NOTES
Ms. Launie Nyhan is a 32 y/o  with CASSANDRA 4/10/21 of Dr. Mello Cordova who presents to [facility] with CC and vaginal bleeding and pain. Was discharged home Yesterday after course of Betamethasone, Magnesium Sulfate, and monitoring for Vaginal bleeding. Was found to have 2 cm DE [facility] and this accounted for Bleeding. Reports Pains started this am, and is having brownish vaginal discharge now. Seen by MFM   on ,  Gms, 48th%, Vtx, CL 3.2 cms, 1-2 cms area at edge of placenta with apparent abruption/MARGI with no active bleeding, no evidence of Previa. Normal TERRY. No past medical history on file. Past Surgical History:   Procedure Laterality Date    HX OTHER SURGICAL  2019    D&C         No family history on file.     Social History     Socioeconomic History    Marital status:      Spouse name: Not on file    Number of children: Not on file    Years of education: Not on file    Highest education level: Not on file   Occupational History    Not on file   Social Needs    Financial resource strain: Not on file    Food insecurity     Worry: Not on file     Inability: Not on file    Transportation needs     Medical: Not on file     Non-medical: Not on file   Tobacco Use    Smoking status: Never Smoker    Smokeless tobacco: Never Used   Substance and Sexual Activity    Alcohol use: Not Currently    Drug use: Never    Sexual activity: Not on file   Lifestyle    Physical activity     Days per week: Not on file     Minutes per session: Not on file    Stress: Not on file   Relationships    Social connections     Talks on phone: Not on file     Gets together: Not on file     Attends Gnosticism service: Not on file     Active member of club or organization: Not on file     Attends meetings of clubs or organizations: Not on file     Relationship status: Not on file    Intimate partner violence     Fear of current or ex partner: Not on file     Emotionally abused: Not on file     Physically abused: Not on file Forced sexual activity: Not on file   Other Topics Concern     Service Not Asked    Blood Transfusions Not Asked    Caffeine Concern Not Asked    Occupational Exposure Not Asked    Hobby Hazards Not Asked    Sleep Concern Not Asked    Stress Concern Not Asked    Weight Concern Not Asked    Special Diet Not Asked    Back Care Not Asked    Exercise Not Asked    Bike Helmet Not Asked   [address] Not Asked    Self-Exams Not Asked   Social History Narrative    Not on file         ALLERGIES: Patient has no known allergies. Review of Systems   Constitutional: Negative. HENT: Negative. Eyes: Negative. Respiratory: Negative. Cardiovascular: Negative. Gastrointestinal: Positive for abdominal pain. Endocrine: Negative. Genitourinary: Positive for vaginal bleeding. Musculoskeletal: Negative. Allergic/Immunologic: Negative. Neurological: Negative. Hematological: Negative. Psychiatric/Behavioral: Negative. There were no vitals filed for this visit. Physical Exam  Constitutional:       Appearance: Normal appearance. HENT:      Head: Normocephalic and atraumatic. Neck:      Musculoskeletal: Normal range of motion. Pulmonary:      Effort: Pulmonary effort is normal.      Breath sounds: Normal breath sounds. Abdominal:      Palpations: Abdomen is soft. Tenderness: There is no guarding or rebound. Genitourinary:     Vagina: Vaginal discharge present. Musculoskeletal: Normal range of motion. Skin:     General: Skin is warm and dry. Neurological:      General: No focal deficit present. Mental Status: She is alert and oriented to person, place, and time.    Psychiatric:         Mood and Affect: Mood normal.         Behavior: Behavior normal.     Bloody show noted on perineum      Perineum: blood absent, amniotic fluid absent  Cervical Exam: 8 cm dilated    90% effaced    -1 station    Presenting Part: cephalic  Cervical Position: mid position  Consistency: Soft  BBOW  Reactive  Baseline: 150 per minute  Variability: moderate  Accelerations: yes  Decelerations: variable  Uterine contractions: regular, every 4-5 minutes      MDM  Number of Diagnoses or Management Options     Amount and/or Complexity of Data Reviewed  Review and summarize past medical records: yes  Discuss the patient with other providers: yes  Independent visualization of images, tracings, or specimens: yes    Risk of Complications, Morbidity, and/or Mortality  Presenting problems: high  Diagnostic procedures: high  Management options: high           Procedures    NST    Dispo: Admit  See Admit note    Gerber Justice.  NALINI Riggs/CARLO

## 2021-01-11 PROCEDURE — 90471 IMMUNIZATION ADMIN: CPT

## 2021-01-11 PROCEDURE — 65410000002 HC RM PRIVATE OB

## 2021-01-11 PROCEDURE — 74011250636 HC RX REV CODE- 250/636: Performed by: OBSTETRICS & GYNECOLOGY

## 2021-01-11 PROCEDURE — 74011250637 HC RX REV CODE- 250/637: Performed by: ADVANCED PRACTICE MIDWIFE

## 2021-01-11 PROCEDURE — 90715 TDAP VACCINE 7 YRS/> IM: CPT | Performed by: OBSTETRICS & GYNECOLOGY

## 2021-01-11 RX ORDER — IBUPROFEN 800 MG/1
800 TABLET ORAL EVERY 8 HOURS
Qty: 30 TAB | Refills: 0 | Status: SHIPPED | OUTPATIENT
Start: 2021-01-11

## 2021-01-11 RX ORDER — DOCUSATE SODIUM 100 MG/1
100 CAPSULE, LIQUID FILLED ORAL
Status: DISCONTINUED | OUTPATIENT
Start: 2021-01-11 | End: 2021-01-12 | Stop reason: HOSPADM

## 2021-01-11 RX ORDER — OXYTOCIN/RINGER'S LACTATE 30/500 ML
10 PLASTIC BAG, INJECTION (ML) INTRAVENOUS AS NEEDED
Status: DISCONTINUED | OUTPATIENT
Start: 2021-01-11 | End: 2021-01-12 | Stop reason: HOSPADM

## 2021-01-11 RX ORDER — IBUPROFEN 400 MG/1
800 TABLET ORAL EVERY 8 HOURS
Status: DISCONTINUED | OUTPATIENT
Start: 2021-01-11 | End: 2021-01-12 | Stop reason: HOSPADM

## 2021-01-11 RX ORDER — ONDANSETRON 4 MG/1
4 TABLET, ORALLY DISINTEGRATING ORAL
Status: ACTIVE | OUTPATIENT
Start: 2021-01-11 | End: 2021-01-12

## 2021-01-11 RX ORDER — DIPHENHYDRAMINE HCL 25 MG
25 CAPSULE ORAL
Status: DISCONTINUED | OUTPATIENT
Start: 2021-01-11 | End: 2021-01-12 | Stop reason: HOSPADM

## 2021-01-11 RX ORDER — ACETAMINOPHEN 325 MG/1
650 TABLET ORAL
Status: DISCONTINUED | OUTPATIENT
Start: 2021-01-11 | End: 2021-01-12 | Stop reason: HOSPADM

## 2021-01-11 RX ORDER — OXYTOCIN/RINGER'S LACTATE 30/500 ML
87.3 PLASTIC BAG, INJECTION (ML) INTRAVENOUS AS NEEDED
Status: DISCONTINUED | OUTPATIENT
Start: 2021-01-11 | End: 2021-01-12 | Stop reason: HOSPADM

## 2021-01-11 RX ORDER — HYDROCORTISONE ACETATE PRAMOXINE HCL 2.5; 1 G/100G; G/100G
CREAM TOPICAL AS NEEDED
Status: DISCONTINUED | OUTPATIENT
Start: 2021-01-11 | End: 2021-01-12 | Stop reason: HOSPADM

## 2021-01-11 RX ORDER — NALOXONE HYDROCHLORIDE 0.4 MG/ML
0.4 INJECTION, SOLUTION INTRAMUSCULAR; INTRAVENOUS; SUBCUTANEOUS AS NEEDED
Status: DISCONTINUED | OUTPATIENT
Start: 2021-01-11 | End: 2021-01-12 | Stop reason: HOSPADM

## 2021-01-11 RX ADMIN — TETANUS TOXOID, REDUCED DIPHTHERIA TOXOID AND ACELLULAR PERTUSSIS VACCINE, ADSORBED 0.5 ML: 5; 2.5; 8; 8; 2.5 SUSPENSION INTRAMUSCULAR at 17:32

## 2021-01-11 RX ADMIN — ACETAMINOPHEN 650 MG: 325 TABLET ORAL at 02:11

## 2021-01-11 RX ADMIN — IBUPROFEN 800 MG: 400 TABLET, FILM COATED ORAL at 19:35

## 2021-01-11 RX ADMIN — IBUPROFEN 800 MG: 400 TABLET, FILM COATED ORAL at 09:59

## 2021-01-11 NOTE — ANESTHESIA POSTPROCEDURE EVALUATION
Post-Anesthesia Evaluation and Assessment    Patient: Liliana Fischer MRN: [id number]  SSN: [id number]    YOB: 1990  Age: 31 y.o. Sex: female      I have evaluated the patient and they are stable and ready for discharge from the PACU. Cardiovascular Function/Vital Signs  Visit Vitals  /68 (BP 1 Location: Left arm, BP Patient Position: At rest)   Pulse (!) 103   Temp 37 °C (98.6 °F)   Resp 14   Ht 5' 1\" (1.549 m)   Wt 60 kg (132 lb 4.4 oz)   SpO2 100%   Breastfeeding No   BMI 24.99 kg/m²       Patient is status post * No anesthesia type entered * anesthesia for * No procedures listed *. Nausea/Vomiting: None    Postoperative hydration reviewed and adequate. Pain:  Pain Scale 1: Numeric (0 - 10) (01/11/21 0030)  Pain Intensity 1: 0 (01/11/21 0030)   Managed    Neurological Status:   Neuro (WDL): Within Defined Limits (01/10/21 2159)   At baseline    Mental Status, Level of Consciousness: Alert and  oriented to person, place, and time    Pulmonary Status:   O2 Device: Room air (01/11/21 0030)   Adequate oxygenation and airway patent    Complications related to anesthesia: None    Post-anesthesia assessment completed. No concerns    Signed By: Branden Kendall MD     January 11, 2021              * No procedures listed *.    epidural    <BSHSIANPOST>    INITIAL Post-op Vital signs: No vitals data found for the desired time range.

## 2021-01-11 NOTE — DISCHARGE SUMMARY
Obstetrical Discharge Summary     Name: Darryl Oneal MRN: [id number]  SSN: [id number]    YOB: 1990  Age: 31 y.o. Sex: female      Admit Date: 1/10/2021    Discharge Date: 2021     Admitting Physician: Lazara Palma MD     Attending Physician:  Daisy Davis MD     Admission Diagnoses:  labor [O60.00]  27 weeks gestation of pregnancy [Z3A.27]    Discharge Diagnoses:   Information for the patient's :  Carmelita Beltran [[id number]]   Delivery of a   female infant via Vaginal, Spontaneous on 1/10/2021 at 9:46 PM  by [facility]. Apgars were 9  and 9 . Additional Diagnoses:   Hospital Problems  Never Reviewed          Codes Class Noted POA     labor ICD-10-CM: O60.00  ICD-9-CM: 644.00  1/10/2021 Unknown        27 weeks gestation of pregnancy ICD-10-CM: Z3A.27  ICD-9-CM: V22.2  1/10/2021 Unknown             Lab Results   Component Value Date/Time    Rubella, External Immune (2.9) 2020    GrBStrep, External Positive 2020 05:20 PM       Hospital Course: Normal hospital course following the delivery. Patient Instructions:   Current Discharge Medication List      START taking these medications    Details   ibuprofen (MOTRIN) 800 mg tablet Take 1 Tab by mouth every eight (8) hours. Qty: 30 Tab, Refills: 0         CONTINUE these medications which have NOT CHANGED    Details   prenatal vit calc,iron,folic (PRENATAL VITAMIN PO) Take 1 Tab by mouth nightly. Indications: pregnancy         STOP taking these medications       cephALEXin (KEFLEX) 500 mg capsule Comments:   Reason for Stopping:               Disposition at Discharge: Home or self care    Condition at Discharge: Stable    Reference my discharge instructions. Follow-up Appointments   Procedures    FOLLOW UP VISIT Appointment in: One Week Mood check     Mood check     Standing Status:   Standing     Number of Occurrences:   1     Order Specific Question:   Appointment in     Answer:    One Week Signed By:  Taj Alva MD     January 11, 2021

## 2021-01-11 NOTE — PROGRESS NOTES
Labor Progress Note  Patient seen, fetal heart rate and contraction pattern evaluated, patient examined. Called to come evaluate pt for progression as FHR picking up just above mons and pt reports feeling some intermittent pressure  Visit Vitals  BP (!) 91/53   Pulse (!) 102   Temp 98.1 °F (36.7 °C)   Resp 16   Ht 5' 1\" (1.549 m)   Wt 132 lb 4.4 oz (60 kg)   SpO2 100%   Breastfeeding No   BMI 24.99 kg/m²       Physical Exam:  Cervical Exam:  C/C/0 with hand directly across head. Attempt at reducing hand through BBOW gently   Membranes:  BBOW  Uterine Activity: ~ Q 4 min, only palpable by exam with BOW and vtx descent  Fetal Heart Rate: Baseline: 150 per minute    Assessment/Plan:  Dr. Rob Hawk in to evaluate compound presentation per my request. Exam same with reduction of hand  To start pushing  NICU aware and coming to set up for delivery    [address]  Bina Tamez, NALINI/CARLO

## 2021-01-11 NOTE — ROUTINE PROCESS
0730: Bedside shift change report given to DILIP Vega RN (oncoming nurse) by Winifred Benson RN (offgoing nurse). Report included the following information SBAR.

## 2021-01-11 NOTE — LACTATION NOTE
Initial Lactation Consultation - Baby born vaginally yesterday to a  mom at 32 2/7 weeks gestation. Infant admitted to NICU. Pt will successfully establish breast milk supply by pumping with a hospital grade pump every 2-3 hours for approximately 20 minutes/8-10 x day with the correct size flange, and suction level for mother's comfort. To maximize milk production, mom taught to incorporate breast massage and hand expression into pumping sessions. All expressed breast milk (EBM) will be provided for infant use, in clean bottles/syringes for storage in NICU breastmilk refrigerator. Patient label with barcode,date and time applied to each container prior to transport to NICU. Proper cleaning of pump parts and good hand hygiene discussed. Mother is advised to rent a hospital grade pump to continue regimen at home. Progress of milk transition, pumping log, expected EBM volumes  discussed. The value of bonding with baby emphasized, strategies for participating in infant care, photos, footprints, touch, and holding skin to skin as soon as baby and mother are able have been shown to increase oxytocin levels. The breast will be offered as baby is ready; with the goal of eventual transition to breastfeeding. I helped mom with hand expression after pumping this morning and we were able to express 3.5 cc's. I labeled the milk and took it to the NICU for the mom .

## 2021-01-11 NOTE — ROUTINE PROCESS
TRANSFER - IN REPORT: 
 
Verbal report received from ADELITA Frances RN (name) on [address] [place] [address]  being received from L&D (unit) for routine progression of care Report consisted of patients Situation, Background, Assessment and  
Recommendations(SBAR). Information from the following report(s) SBAR was reviewed with the receiving nurse. Opportunity for questions and clarification was provided. Assessment completed upon patients arrival to unit and care assumed.

## 2021-01-11 NOTE — PROGRESS NOTES
~1940: Bedside and Verbal shift change report given to ADELITA Muro RN by SASHA Lomax RN. Report included the following information SBAR, MAR, Accordion and Recent Results.   ~2118: The patient states that she is starting to feel some rectal pressure intermittently.  ~2120: OTONIEL Lovell CNM is called and given an update.  ~2121: OTONIEL Lovell CNM is at the bedside.  ~2123: The patient is 10/100; OTONIEL Lovell CNM asks that Dr Fraire come the bedside to assess the presenting part before AROM.  ~2126: Dr Fraire is at the bedside assessing the patient. She states that it is ok to start pushing.  ~2350: The patient is assisted up to a wheelchair.  The patient and her  are taken to NICU to see their baby.  ~1/11/21@0025: TRANSFER - OUT REPORT:    Verbal report given to TANESHA Marks RN on Ingrid Elmore  being transferred to  for routine progression of care       Report consisted of patient’s Situation, Background, Assessment and   Recommendations(SBAR).     Information from the following report(s) SBAR, MAR and Accordion was reviewed with the receiving nurse.    Lines:   Peripheral IV 01/10/21 Right Wrist (Active)   Site Assessment Clean, dry, & intact 01/10/21 2000   Phlebitis Assessment 0 01/10/21 2000   Infiltration Assessment 0 01/10/21 2000   Dressing Status Clean, dry, & intact 01/10/21 2000   Dressing Type Tape;Transparent 01/10/21 2000   Hub Color/Line Status Pink 01/10/21 2000        Opportunity for questions and clarification was provided.      Patient transported with:   Registered Nurse

## 2021-01-11 NOTE — PROGRESS NOTES
Post-Partum Day Number 1 Progress Note    Ingrid Elmore     Assessment: Doing well, post partum day 1    Plan:  1. Continue routine postpartum and perineal care as well as maternal education. 2. Baby girl stable in NICU    Information for the patient's :  Karan Sands [[id number]]   Vaginal, Spontaneous    Patient doing well without significant complaint. Voiding without difficulty, normal lochia. Vitals:  Visit Vitals  BP 95/62 (BP 1 Location: Right arm, BP Patient Position: At rest)   Pulse 98   Temp 98.2 °F (36.8 °C)   Resp 14   Ht 5' 1\" (1.549 m)   Wt 60 kg (132 lb 4.4 oz)   SpO2 100%   Breastfeeding No   BMI 24.99 kg/m²     Temp (24hrs), Av.6 °F (37 °C), Min:98 °F (36.7 °C), Max:99.9 °F (37.7 °C)        Exam:   Patient without distress. Abdomen soft, fundus firm, nontender                Perineum with normal lochia noted. Lower extremities are negative for swelling, cords or tenderness.     Labs:     Lab Results   Component Value Date/Time    WBC 15.3 (H) 01/10/2021 12:47 PM    WBC 11.0 2021 01:36 PM    WBC 10.9 2021 12:19 AM    WBC 10.2 2020 12:38 PM    HGB 11.5 01/10/2021 12:47 PM    HGB 9.7 (L) 2021 01:36 PM    HGB 9.8 (L) 2021 12:19 AM    HGB 10.8 (L) 2020 12:38 PM    HCT 34.2 (L) 01/10/2021 12:47 PM    HCT 29.8 (L) 2021 01:36 PM    HCT 30.3 (L) 2021 12:19 AM    HCT 33.1 (L) 2020 12:38 PM    PLATELET 896  12:47 PM    PLATELET 211 (L)  01:36 PM    PLATELET 631  12:19 AM    PLATELET 079 (L)  12:38 PM       Recent Results (from the past 24 hour(s))   CBC WITH AUTOMATED DIFF    Collection Time: 01/10/21 12:47 PM   Result Value Ref Range    WBC 15.3 (H) 3.6 - 11.0 K/uL    RBC 4.27 3.80 - 5.20 M/uL    HGB 11.5 11.5 - 16.0 g/dL    HCT 34.2 (L) 35.0 - 47.0 %    MCV 80.1 80.0 - 99.0 FL    MCH 26.9 26.0 - 34.0 PG    MCHC 33.6 30.0 - 36.5 g/dL    RDW 16.5 (H) 11.5 - 14.5 %    PLATELET 969 426 - 883 K/uL    MPV 10.0 8.9 - 12.9 FL    NRBC 0.0 0  WBC    ABSOLUTE NRBC 0.00 0.00 - 0.01 K/uL    NEUTROPHILS 77 (H) 32 - 75 %    LYMPHOCYTES 13 12 - 49 %    MONOCYTES 7 5 - 13 %    EOSINOPHILS 0 0 - 7 %    BASOPHILS 0 0 - 1 %    MYELOCYTES 1 (H) 0 %    PROMYELOCYTES 2 (H) 0 %    IMMATURE GRANULOCYTES 0 %    ABS. NEUTROPHILS 11.8 (H) 1.8 - 8.0 K/UL    ABS. LYMPHOCYTES 2.0 0.8 - 3.5 K/UL    ABS. MONOCYTES 1.1 (H) 0.0 - 1.0 K/UL    ABS. EOSINOPHILS 0.0 0.0 - 0.4 K/UL    ABS. BASOPHILS 0.0 0.0 - 0.1 K/UL    ABS. IMM.  GRANS. 0.0 K/UL    DF MANUAL      RBC COMMENTS ANISOCYTOSIS  1+

## 2021-01-11 NOTE — L&D DELIVERY NOTE
Delivery Summary    Became Complete and +1, with increasing urge to push. AROM with UC when NICU in room and ready for delivery. Pushed for ~ 15 min.  Live Female infant. Over Intact perineum. Infant placed to maternal abdomen for 30 sec.  spontaneous cry then doubly clamped and cut per CNM. Handed to awaiting NICU nurse @ Bedside for continuation of care. 3VC. Cordblood Obtained yes. Placenta and cord, Spont Brown, Intact. Lower Uterine Segment atony resolved with massage, Pitocin bolus and Cytotec 600 mcg MO. /QBL pending . All counts correct yes. To RR, Stable. Patient: Jillian Singleton MRN: [id number]  SSN: [id number]    YOB: 1990  Age: 31 y.o. Sex: female       Information for the patient's :  Lenore Rodriguez [[id number]]       Labor Events:    Labor: Yes    Steroids: Full Course   Cervical Ripening Date/Time:       Cervical Ripening Type:     Antibiotics During Labor: Yes   Rupture Identifier:      Rupture Date/Time:       Rupture Type: AROM   Amniotic Fluid Volume:  Moderate    Amniotic Fluid Description: Clear    Amniotic Fluid Odor: None    Induction: None       Induction Date/Time:        Indications for Induction:      Augmentation: None   Augmentation Date/Time:      Indications for Augmentation:     Labor complications: None       Additional complications:        Delivery Events:  Indications For Episiotomy:     Episiotomy: None   Perineal Laceration(s):     Repaired:     Periurethral Laceration Location:      Repaired:     Labial Laceration Location:     Repaired:     Sulcal Laceration Location:     Repaired:     Vaginal Laceration Location:     Repaired:     Cervical Laceration Location:     Repaired:     Repair Suture:     Number of Repair Packets:     Estimated Blood Loss (ml):  ml   Quantitative Blood Loss (ml)                Delivery Date: 1/10/2021    Delivery Time: 9:46 PM  Delivery Type: Vaginal, Spontaneous  Sex:  Female Gestational Age: 27w1d   Delivery Clinician:  Victorina Mccann  Living Status: Living   Delivery Location: L&D L&D 4          APGARS  One minute Five minutes Ten minutes   Skin color: 1   1        Heart rate: 2   2        Grimace: 2   2        Muscle tone: 2   2        Breathin   2        Totals: 9   9            Presentation: Vertex    Position: Left Occiput Anterior  Resuscitation Method:  Tactile Stimulation;Suctioning-bulb;C-PAP     Meconium Stained: None      Cord Information: 3 Vessels  Complications: None  Cord around:    Delayed cord clamping? Yes  Cord clamped date/time:1/10/2021  9:46 PM  Disposition of Cord Blood: Lab    Blood Gases Sent?: No    Placenta:  Date/Time:    Removal:        Appearance:       [place] Measurements:  Birth Weight:        Birth Length:        Head Circumference:        Chest Circumference:       Abdominal Girth: Other Providers:   NEELA HOFFMAN;JOELLEN LLANES;SABA JORDAN;MICHOACANO RAO;KEY GARCIAS JR;LISA PEREZ, Primary Nurse;Midwife;Nicu Nurse;Nicu Nurse;Respiratory Therapist;Nurse Practitioner           Group B Strep:   Lab Results   Component Value Date/Time    GrBStrep, External Positive 2020 05:20 PM     Information for the patient's :  Renée Taylor [[id number]]   No results found for: ABORH, PCTABR, PCTDIG, BILI, ABORHEXT, ABORH     No results for input(s): PCO2CB, PO2CB, HCO3I, SO2I, IBD, PTEMPI, SPECTI, PHICB, ISITE, IDEV, IALLEN in the last 72 hours.

## 2021-01-12 VITALS
BODY MASS INDEX: 24.97 KG/M2 | SYSTOLIC BLOOD PRESSURE: 103 MMHG | WEIGHT: 132.28 LBS | HEIGHT: 61 IN | HEART RATE: 90 BPM | OXYGEN SATURATION: 99 % | RESPIRATION RATE: 16 BRPM | TEMPERATURE: 97.5 F | DIASTOLIC BLOOD PRESSURE: 71 MMHG

## 2021-01-12 PROCEDURE — 74011250637 HC RX REV CODE- 250/637: Performed by: ADVANCED PRACTICE MIDWIFE

## 2021-01-12 RX ADMIN — IBUPROFEN 800 MG: 400 TABLET, FILM COATED ORAL at 07:42

## 2021-01-12 NOTE — LACTATION NOTE
Mom for discharge today. Hospital grade pump rented to family. Reviewed methods to decrease stress while pumping, incorporating intermittent breast massage, storage of milk, cleaning of pump, nutrition (handout given), and expected amounts. Family denies additional questions at this time and will get labels and bottles from NICU before discharge.

## 2021-01-12 NOTE — PROGRESS NOTES
Report received from DILIP Harvey RN using SBAR. I have reviewed discharge instructions with the patient. The patient verbalized understanding.

## 2021-01-12 NOTE — PROGRESS NOTES
Post-Partum Day Number 2 Progress Note    Ingrid Dotypaulakeith     Assessment: Doing well, post partum day 2  27wk female  stable in the NICU    Plan:   1. Discharge home today  2. Follow up in office in 6 weeks with Heydi Merchant MD  3. Post partum activity advised, diet as tolerated  4. Discharge Medications: ibuprofen and medications prior to admission    Information for the patient's :  Delorse Uatsdin [[id number]]   Vaginal, Spontaneous    Patient doing well without significant complaint. Voiding without difficulty, normal lochia. Vitals:  Visit Vitals  BP (!) 92/58 (BP 1 Location: Left arm, BP Patient Position: At rest)   Pulse 69   Temp 97.5 °F (36.4 °C)   Resp 14   Ht 5' 1\" (1.549 m)   Wt 60 kg (132 lb 4.4 oz)   SpO2 99%   Breastfeeding Unknown   BMI 24.99 kg/m²     Temp (24hrs), Av.9 °F (36.6 °C), Min:97.5 °F (36.4 °C), Max:98.4 °F (36.9 °C)      Exam:         Patient without distress. Abdomen soft, fundus firm, nontender                 Lower extremities are negative for swelling, cords or tenderness. Labs:     Lab Results   Component Value Date/Time    WBC 15.3 (H) 01/10/2021 12:47 PM    WBC 11.0 2021 01:36 PM    WBC 10.9 2021 12:19 AM    WBC 10.2 2020 12:38 PM    HGB 11.5 01/10/2021 12:47 PM    HGB 9.7 (L) 2021 01:36 PM    HGB 9.8 (L) 2021 12:19 AM    HGB 10.8 (L) 2020 12:38 PM    HCT 34.2 (L) 01/10/2021 12:47 PM    HCT 29.8 (L) 2021 01:36 PM    HCT 30.3 (L) 2021 12:19 AM    HCT 33.1 (L) 2020 12:38 PM    PLATELET 391  12:47 PM    PLATELET 743 (L)  01:36 PM    PLATELET 684  12:19 AM    PLATELET 224 (L)  12:38 PM       No results found for this or any previous visit (from the past 24 hour(s)).

## 2021-01-12 NOTE — DISCHARGE INSTRUCTIONS
Patient Education         Birth: Care Instructions  Your Care Instructions     Many things can cause a baby to be born early. Some early births are planned, such as with twins or triplets. But in most cases, a birth that happens weeks before the due date is a surprise. Whatever the reason, your doctor and medical team will work hard for your baby's health. If you know you will give birth early, then you, your partner, and your doctor can prepare for a  birth. Your baby may be delivered through a cut, called an incision, in your belly. This surgery is called a  delivery, or a . The surgery will make it hard for you to move around for a while. A childbirth (obstetric) team and a new baby () team will be there for your baby's birth. The  team will bring special equipment with them, including a bed with an overhead heater. The obstetric team will take care of you while the  team takes care of your baby. Your baby may need special care for some time. The doctors and nurses in the hospital nursery or the  intensive care unit (NICU) can help a  baby get stronger. Your baby may have trouble feeding. The hospital staff can show you how to get your milk to your baby. Follow-up care is a key part of your treatment and safety. Be sure to make and go to all appointments, and call your doctor if you are having problems. It's also a good idea to know your test results and keep a list of the medicines you take. How can you care for yourself at home? Before your baby comes home  · Some early babies stay in the hospital for a few days to weeks. Talk with your doctor about this. · It is normal to worry about your premature baby's health. Talk with your baby's doctor about your baby's care. Ask how your baby is responding to treatment. Good nutrition, hospital and home care, and lots of love will help your baby grow.   · Your breast milk will come in 3 or 4 days after the birth. So before the birth you will need to decide if you will breastfeed. If you decide to breastfeed:  ? You may need to pump milk for feedings. Theador Gonsales do this until your infant is mature enough to feed by mouth. ? You may want to spend the night with your infant. You'll be able to see if he or she is strong enough to nurse around the clock. · Your baby may sleep most of the time. It may seem like it takes a long time for your baby to respond to you. · Machines may help your baby stay warm, breathe, and eat. Ask the NICU staff to explain how the equipment works. With their help, you can quickly learn about the treatment, your baby's needs, and what you can do for your baby. The more you learn while your baby is in the NICU, the better you will be able to take care of your baby at home. · As your baby grows stronger, you will be able to take on more of the caregiving. You will be able to change diapers and hold, feed, and bathe your baby. · If your baby will need special equipment, you will get written instructions for its use. To take care of yourself  · Follow any instructions your doctor has given you for your own care. This will guide your activities and tell you what to watch for in the next few weeks. · Get as much rest as possible. · Do not have sex unless your doctor says it is okay. · Do not smoke or allow others to smoke around you. If you need help quitting, talk to your doctor about stop-smoking programs and medicines. These can increase your chances of quitting for good. · Your doctor can refer you to support services, such as support groups. They can help you learn what to expect and how to care for your premature baby. Talking with other parents who are dealing with the same things you are will help you with both the challenges and the joys ahead. ·  birth is very stressful and tiring.  It is important that you and your partner take good care of yourselves and each other.  Where can you learn more? Go to http://www.gray.com/  Enter N968 in the search box to learn more about \" Birth: Care Instructions. \"  Current as of: 2020               Content Version: 12.6   ISH. Care instructions adapted under license by [employer] (which disclaims liability or warranty for this information). If you have questions about a medical condition or this instruction, always ask your healthcare professional. [place] [address] any warranty or liability for your use of this information. Patient Education          Patient Education        Vaginal Childbirth: Care Instructions  Overview     Vaginal birth means delivering a baby through the birth canal (vagina). During labor, the uterus tightens (contracts) regularly to thin and open the cervix and to push the baby out through the birth canal.  Your body will slowly heal in the next few weeks. It's easy to get too tired and overwhelmed during the first weeks after your baby is born. Changes in your hormones can shift your mood without warning. You may find it hard to meet the extra demands on your energy and time. Take it easy on yourself. Follow-up care is a key part of your treatment and safety. Be sure to make and go to all appointments, and call your doctor if you are having problems. It's also a good idea to know your test results and keep a list of the medicines you take. How can you care for yourself at home? Vaginal bleeding and cramps  · After delivery, you will have a bloody discharge from your vagina. This will turn pink within a week and then white or yellow after about 10 days. It may last for 2 to 4 weeks or longer, until the uterus has healed. Use pads instead of tampons until you stop bleeding. · Don't worry if you pass some blood clots, as long as they are smaller than a golf ball.  If you have a tear or stitches in your vaginal area, change the pad at least every 4 hours. This will help prevent soreness and infection. · You may have cramps for the first few days after childbirth. These are normal and occur as the uterus shrinks to normal size. Take an over-the-counter pain medicine, such as acetaminophen (Tylenol), ibuprofen (Advil, Motrin), or naproxen (Aleve), for cramps. Read and follow all instructions on the label. Do not take aspirin, because it can cause more bleeding. · Do not take two or more pain medicines at the same time unless the doctor told you to. Many pain medicines have acetaminophen, which is Tylenol. Too much acetaminophen (Tylenol) can be harmful. Stitches  · If you have stitches, they will dissolve on their own and don't need to be removed. Follow your doctor's instructions for cleaning the stitched area. · Put ice or a cold pack on your painful area for 10 to 20 minutes at a time, several times a day, for the first few days. Put a thin cloth between the ice and your skin. · Sit in a few inches of warm water (sitz bath) 3 times a day and after bowel movements. The warm water helps with pain and itching. If you don't have a tub, a warm shower might help. Breast fullness  · Your breasts may overfill (engorge) in the first few days after delivery. To help milk flow and to relieve pain, warm your breasts in the shower or by using warm, moist towels before nursing. · If you aren't nursing, don't put warmth on your breasts or touch your breasts. Wear a tight bra or sports bra and use ice until the fullness goes away. This usually takes 2 to 3 days. · Put ice or a cold pack on your breast after nursing to reduce swelling and pain. Put a thin cloth between the ice and your skin. Activity  · Eat a balanced diet. Don't try to lose weight by cutting calories. Keep taking your prenatal vitamins, or take a multivitamin. · Get as much rest as you can. Try to take naps when your baby sleeps during the day.   · Get some exercise every day. But don't do any heavy exercise until your doctor says it is okay. · Wait until you are healed (about 4 to 6 weeks) before you have sexual intercourse. Your doctor will tell you when it is okay to have sex. · Talk to your doctor about birth control. You can get pregnant even before your period returns. Also, you can get pregnant while you are breastfeeding. Mental health  · It's normal to have some sadness, anxiety, sleeplessness, and mood swings after you go home. If you feel upset or hopeless for more than a few days or are having trouble doing the things you need to do, talk to your doctor. Constipation and hemorrhoids  · Drink plenty of fluids, enough so that your urine is light yellow or clear like water. If you have kidney, heart, or liver disease and have to limit fluids, talk with your doctor before you increase the amount of fluids you drink. · Eat plenty of fiber each day. Have a bran muffin or bran cereal for breakfast. Try eating a piece of fruit for a mid-afternoon snack. · For painful, itchy hemorrhoids, put ice or a cold pack on the area several times a day for 10 minutes at a time. Follow this by putting a warm compress on the area for another 10 to 20 minutes or by sitting in a shallow, warm bath. When should you call for help? Call  911 anytime you think you may need emergency care. For example, call if:    · You have thoughts of harming yourself, your baby, or another person.     · You passed out (lost consciousness).     · You have chest pain, are short of breath, or cough up blood.     · You have a seizure.    Call your doctor now or seek immediate medical care if:    · You have severe vaginal bleeding.     · You are dizzy or lightheaded, or you feel like you may faint.     · You have a fever.     · You have new or more pain in your belly or pelvis.     · You have symptoms of a blood clot in your leg (called a deep vein thrombosis), such as:  ? Pain in the calf, back of the knee, thigh, or groin. ? Redness and swelling in your leg or groin.     · You have signs of preeclampsia, such as:  ? Sudden swelling of your face, hands, or feet. ? New vision problems (such as dimness, blurring, or seeing spots). ? A severe headache. Watch closely for changes in your health, and be sure to contact your doctor if:    · Your vaginal bleeding seems to be getting heavier.     · You have new or worse vaginal discharge.     · You feel sad, anxious, or hopeless for more than a few days.     · You do not get better as expected. Where can you learn more? Go to http://www.gray.com/  Enter Q237 in the search box to learn more about \"Vaginal Childbirth: Care Instructions. \"  Current as of: 2020               Content Version: 12.6  © 3946-8481 [employer]. Care instructions adapted under license by [employer] (which disclaims liability or warranty for this information). If you have questions about a medical condition or this instruction, always ask your healthcare professional. [address] any warranty or liability for your use of this information. Where can you learn more? Go to http://www.whittaker.com/  Enter M806 in the search box to learn more about \" Section: What to Expect at Home. \"  Current as of: 2020               Content Version: 12.6  © 7153-8616 [employer]. Care instructions adapted under license by [employer] (which disclaims liability or warranty for this information). If you have questions about a medical condition or this instruction, always ask your healthcare professional. [address] any warranty or liability for your use of this information.